# Patient Record
Sex: FEMALE | Race: WHITE
[De-identification: names, ages, dates, MRNs, and addresses within clinical notes are randomized per-mention and may not be internally consistent; named-entity substitution may affect disease eponyms.]

---

## 2021-02-05 LAB
AVERAGE GLUCOSE: NORMAL
HBA1C MFR BLD: 11.5 %

## 2022-08-01 ASSESSMENT — ENCOUNTER SYMPTOMS
DIARRHEA: 0
NAUSEA: 0
SHORTNESS OF BREATH: 0
VOMITING: 0
ABDOMINAL PAIN: 0
COUGH: 0

## 2022-08-01 NOTE — PROGRESS NOTES
[address] 65, DO  [address] [facility], [address], [address]  [phone]        ASSESSMENT AND PLAN    Problem List Items Addressed This Visit          Other    Encounter to establish care with new doctor - Primary    Abnormal urine odor      Patient reports foul-smelling, foamy urine for some time. Just started her period after no period for 6 months. Denies dysuria. Believes she is menopausal.  UA today shows 1+ leukocyte Esterase and large blood (likely from. ). We will send urine culture. Relevant Orders    AMB POC URINALYSIS DIP STICK AUTO W/O MICRO (Completed)    Lipid Panel    TSH    Comprehensive Metabolic Panel    Hemoglobin A1C    CBC with Auto Differential    Family history of diabetes mellitus in mother    Relevant Orders    Hemoglobin A1C    Weight gain     Reports some weight gain over the past few months, states that she has not changed her routine but is concerned it may be due to hormones. We will check TSH and labs but likely related to menopause. Patient plans to start going to the gym and plans to start intermittent fasting. We will continue to follow. Relevant Orders    Lipid Panel    TSH    Comprehensive Metabolic Panel    Hemoglobin A1C    CBC with Auto Differential    Pyuria    Relevant Orders    Culture, Urine    Bilateral low back pain without sciatica     Patient with months of low back pain and stiffness when she wakes up, admits to using a new mattress since getting . Denies radiation into her legs or numbness or tingling. Improves with going to the chiropractor and Tylenol as needed. We will send exercises via [employer]. Patient had an x-ray done last week at urgent care and we will try to upload that report for me to [address] We will recheck in 3 to 4 months.          Relevant Orders    Lipid Panel    TSH    Comprehensive Metabolic Panel    Hemoglobin A1C    CBC with Auto Differential     Other Visit Diagnoses       Screening for thyroid disorder        Relevant Orders    TSH    Screening, anemia, deficiency, iron        Relevant Orders    CBC with Auto Differential    Screening for diabetes mellitus        Relevant Orders    Hemoglobin A1C    Screening for lipid disorders        Relevant Orders    Lipid Panel             The diagnoses and plan were discussed with the patient, who verbalizes understanding and agrees with plan. All questions answered. Chief Complaint    Chief Complaint   Patient presents with    Establish Care    Flank Pain     Pt states symptoms started about a week ago. Rose Mayr Swift has  some odor and is \" foamy \"     Menstrual Problem     Pt states she has not had a menstrual cycle in months but is currently on one and is very light. HISTORY OF PRESENT ILLNESS    45 y.o. female presents today to establish care with a new primary care provider. States that she has had low back pain for the last few months. States that she wakes up with it every morning so has to stretch. Feels very stiff. States that she went to urgent care last week and had an X-ray that showed a chip around L4. Notes that she takes Tylenol as needed for pain because she can't take NSAIDs due to itching. States that they ran a urine that was normal.  States that she has also had some smelly, foamy urine for awhile. Has had some intermittent hot flashes and believes she is going through menopause. States that she has also had issues with weight gain and just started her period two days ago after no period for six months. States that she had her IUD removed one year ago and is not currently on any contraceptives. States that she recently switched to her 's mattress which is firmer and she believes this is contributing to her back pain. Denies radiation of pain into her legs. Denies numbness or tingling.   Notes that she has gained weight in her abdomen and plans to do more abdominal exercises, as well as intermittent fasting. Has not had routine labs checked in some time. Is not fasting today. PAST MEDICAL HISTORY    Past Medical History:   Diagnosis Date    Acute anemia        PAST SURGICAL HISTORY    Past Surgical History:   Procedure Laterality Date     SECTION         FAMILY HISTORY    Family History   Problem Relation Age of Onset    Depression Mother     High Cholesterol Mother     Diabetes Mother     No Known Problems Father        SOCIAL HISTORY    Social History     Socioeconomic History    Marital status:      Spouse name: None    Number of children: None    Years of education: None    Highest education level: None   Tobacco Use    Smoking status: Never     Passive exposure: Never    Smokeless tobacco: Never   Vaping Use    Vaping Use: Never used   Substance and Sexual Activity    Alcohol use: Yes     Alcohol/week: 2.0 standard drinks     Types: 2 Shots of liquor per week    Drug use: Never    Sexual activity: Yes     Partners: Male     Social Determinants of Health     Financial Resource Strain: Low Risk     Difficulty of Paying Living Expenses: Not hard at all   Food Insecurity: No Food Insecurity    Worried About Running Out of Food in the Last Year: Never true    Ran Out of Food in the Last Year: Never true       MEDICATIONS    No current outpatient medications on file. ALLERGIES / INTOLERANCES    Allergies   Allergen Reactions    Ibuprofen Hives    Naproxen Itching    Codeine Dizziness or Vertigo, Hallucinations, Hives, Itching, Nausea And Vomiting and Palpitations     narcotics         REVIEW OF SYSTEMS    Review of Systems   Constitutional:  Negative for fever. HENT:  Negative for congestion. Respiratory:  Negative for cough and shortness of breath. Cardiovascular:  Negative for chest pain. Gastrointestinal:  Negative for abdominal pain, diarrhea, nausea and vomiting. Genitourinary:  Positive for flank pain and vaginal bleeding.    Psychiatric/Behavioral:

## 2022-08-03 ENCOUNTER — OFFICE VISIT (OUTPATIENT)
Dept: PRIMARY CARE CLINIC | Facility: CLINIC | Age: 52
End: 2022-08-03
Payer: COMMERCIAL

## 2022-08-03 VITALS
BODY MASS INDEX: 24.14 KG/M2 | OXYGEN SATURATION: 99 % | DIASTOLIC BLOOD PRESSURE: 64 MMHG | HEIGHT: 64 IN | RESPIRATION RATE: 16 BRPM | TEMPERATURE: 98 F | HEART RATE: 75 BPM | SYSTOLIC BLOOD PRESSURE: 118 MMHG | WEIGHT: 141.4 LBS

## 2022-08-03 DIAGNOSIS — R82.81 PYURIA: ICD-10-CM

## 2022-08-03 DIAGNOSIS — Z13.29 SCREENING FOR THYROID DISORDER: ICD-10-CM

## 2022-08-03 DIAGNOSIS — Z76.89 ENCOUNTER TO ESTABLISH CARE WITH NEW DOCTOR: Primary | ICD-10-CM

## 2022-08-03 DIAGNOSIS — Z13.1 SCREENING FOR DIABETES MELLITUS: ICD-10-CM

## 2022-08-03 DIAGNOSIS — Z83.3 FAMILY HISTORY OF DIABETES MELLITUS IN MOTHER: ICD-10-CM

## 2022-08-03 DIAGNOSIS — R82.90 ABNORMAL URINE ODOR: ICD-10-CM

## 2022-08-03 DIAGNOSIS — M54.50 BILATERAL LOW BACK PAIN WITHOUT SCIATICA, UNSPECIFIED CHRONICITY: ICD-10-CM

## 2022-08-03 DIAGNOSIS — R63.5 WEIGHT GAIN: ICD-10-CM

## 2022-08-03 DIAGNOSIS — Z13.0 SCREENING, ANEMIA, DEFICIENCY, IRON: ICD-10-CM

## 2022-08-03 DIAGNOSIS — Z13.220 SCREENING FOR LIPID DISORDERS: ICD-10-CM

## 2022-08-03 PROBLEM — R87.810 ASCUS WITH POSITIVE HIGH RISK HPV CERVICAL: Status: ACTIVE | Noted: 2021-02-16

## 2022-08-03 PROBLEM — R87.610 ASCUS WITH POSITIVE HIGH RISK HPV CERVICAL: Status: ACTIVE | Noted: 2021-02-16

## 2022-08-03 LAB
BILIRUBIN, URINE, POC: NEGATIVE
BLOOD URINE, POC: ABNORMAL
GLUCOSE URINE, POC: NEGATIVE
KETONES, URINE, POC: NEGATIVE
LEUKOCYTE ESTERASE, URINE, POC: ABNORMAL
NITRITE, URINE, POC: NEGATIVE
PH, URINE, POC: 6 (ref 4.6–8)
PROTEIN,URINE, POC: NEGATIVE
SPECIFIC GRAVITY, URINE, POC: 1.03 (ref 1–1.03)
URINALYSIS CLARITY, POC: CLEAR
URINALYSIS COLOR, POC: YELLOW
UROBILINOGEN, POC: 0.2

## 2022-08-03 PROCEDURE — 81003 URINALYSIS AUTO W/O SCOPE: CPT | Performed by: FAMILY MEDICINE

## 2022-08-03 PROCEDURE — 99204 OFFICE O/P NEW MOD 45 MIN: CPT | Performed by: FAMILY MEDICINE

## 2022-08-03 SDOH — ECONOMIC STABILITY: FOOD INSECURITY: WITHIN THE PAST 12 MONTHS, YOU WORRIED THAT YOUR FOOD WOULD RUN OUT BEFORE YOU GOT MONEY TO BUY MORE.: NEVER TRUE

## 2022-08-03 SDOH — ECONOMIC STABILITY: FOOD INSECURITY: WITHIN THE PAST 12 MONTHS, THE FOOD YOU BOUGHT JUST DIDN'T LAST AND YOU DIDN'T HAVE MONEY TO GET MORE.: NEVER TRUE

## 2022-08-03 ASSESSMENT — ANXIETY QUESTIONNAIRES
4. TROUBLE RELAXING: 0
GAD7 TOTAL SCORE: 0
2. NOT BEING ABLE TO STOP OR CONTROL WORRYING: 0
1. FEELING NERVOUS, ANXIOUS, OR ON EDGE: 0
7. FEELING AFRAID AS IF SOMETHING AWFUL MIGHT HAPPEN: 0
IF YOU CHECKED OFF ANY PROBLEMS ON THIS QUESTIONNAIRE, HOW DIFFICULT HAVE THESE PROBLEMS MADE IT FOR YOU TO DO YOUR WORK, TAKE CARE OF THINGS AT HOME, OR GET ALONG WITH OTHER PEOPLE: NOT DIFFICULT AT ALL
3. WORRYING TOO MUCH ABOUT DIFFERENT THINGS: 0
6. BECOMING EASILY ANNOYED OR IRRITABLE: 0
5. BEING SO RESTLESS THAT IT IS HARD TO SIT STILL: 0

## 2022-08-03 ASSESSMENT — PATIENT HEALTH QUESTIONNAIRE - PHQ9
SUM OF ALL RESPONSES TO PHQ QUESTIONS 1-9: 0
SUM OF ALL RESPONSES TO PHQ QUESTIONS 1-9: 0
2. FEELING DOWN, DEPRESSED OR HOPELESS: 0
1. LITTLE INTEREST OR PLEASURE IN DOING THINGS: 0
SUM OF ALL RESPONSES TO PHQ QUESTIONS 1-9: 0
SUM OF ALL RESPONSES TO PHQ9 QUESTIONS 1 & 2: 0
SUM OF ALL RESPONSES TO PHQ QUESTIONS 1-9: 0

## 2022-08-03 ASSESSMENT — SOCIAL DETERMINANTS OF HEALTH (SDOH): HOW HARD IS IT FOR YOU TO PAY FOR THE VERY BASICS LIKE FOOD, HOUSING, MEDICAL CARE, AND HEATING?: NOT HARD AT ALL

## 2022-08-03 NOTE — ASSESSMENT & PLAN NOTE
Patient reports foul-smelling, foamy urine for some time. Just started her period after no period for 6 months. Denies dysuria. Believes she is menopausal.  UA today shows 1+ leukocyte Esterase and large blood (likely from. ). We will send urine culture.

## 2022-08-03 NOTE — PATIENT INSTRUCTIONS
It was great to meet you today! I will contact you about your lab results via [address] Please make sure to drink plenty of water while you are working on your diet. Eat less red meat, less starchy foods and less sweets. I will send you some more stretches to help with your back. Continue using Tylenol if needed for pain. Consider alternating ice and heat, and go to the chiropractor as long as it is helping. If your symptoms get worse please let me know.     I will see you in a few months but please all with concerns - [phone]

## 2022-08-03 NOTE — ASSESSMENT & PLAN NOTE
Patient with months of low back pain and stiffness when she wakes up, admits to using a new mattress since getting . Denies radiation into her legs or numbness or tingling. Improves with going to the chiropractor and Tylenol as needed. Discussed alternating ice and heat. We will send exercises via [employer]. Patient had an x-ray done last week at urgent care and we will try to upload that report for me to [address] We will recheck in 3 to 4 months. Please triage, check with Joselyn Evangeline, and direct patient. Thanks         From: Shantel Manual  To: Ivette Danielamileylois  Sent: 12/7/2020  5:22 AM CST  Subject: Non-Urgent Medical Question    Good morning Ivette,  So my joint pain is back. Left hip very painful, right thumb also. I am trying to manage with naproxen, but wondering if a small additional dose of methotrexate would be helpful? Or something else? Please let me know your thoughts. I'm using [facility] [address] in [facility] now.   Thanks, Jannie Marks

## 2022-08-03 NOTE — ASSESSMENT & PLAN NOTE
Left message to call for results. Reports some weight gain over the past few months, states that she has not changed her routine but is concerned it may be due to hormones. We will check TSH and labs but likely related to menopause. Patient plans to start going to the gym and plans to start intermittent fasting. We will continue to follow.

## 2022-08-06 LAB
BACTERIA SPEC CULT: NORMAL
SERVICE CMNT-IMP: NORMAL

## 2022-09-02 ENCOUNTER — OFFICE VISIT (OUTPATIENT)
Dept: ORTHOPEDIC SURGERY | Age: 52
End: 2022-09-02
Payer: COMMERCIAL

## 2022-09-02 DIAGNOSIS — M54.50 CHRONIC BILATERAL LOW BACK PAIN WITHOUT SCIATICA: ICD-10-CM

## 2022-09-02 DIAGNOSIS — M54.50 ACUTE BILATERAL LOW BACK PAIN, UNSPECIFIED WHETHER SCIATICA PRESENT: Primary | ICD-10-CM

## 2022-09-02 DIAGNOSIS — G89.29 CHRONIC BILATERAL LOW BACK PAIN WITHOUT SCIATICA: ICD-10-CM

## 2022-09-02 DIAGNOSIS — M47.816 FACET ARTHROPATHY, LUMBAR: ICD-10-CM

## 2022-09-02 PROCEDURE — 99203 OFFICE O/P NEW LOW 30 MIN: CPT | Performed by: PHYSICIAN ASSISTANT

## 2022-09-02 NOTE — PROGRESS NOTES
Name: Khalida King  YOB: 1970  Gender: female  MRN: [id number]    CC: Back Pain (Low back pain into bilateral hips and feet)       HPI: This is a 47y.o. year old female who presents with several months history of pain in the lower back. Pain can radiate across her back into the buttocks. It does not radiate into the legs. There is no numbness tingling or weakness. Pain is usually worse after standing and walking. 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 She has seen chiropractor for 2 months and initially she will get some short-term relief with some adjustments but then it seems like she has limited relief. She is also tried massage therapy. She cannot take NSAIDs. She is tried ibuprofen and Aleve however she gets symptoms that seem like allergic reaction. This patient  has not had lumbar surgery in the past.      AMB PAIN ASSESSMENT 2022   Location of Pain Back   Location Modifiers Left;Right   Severity of Pain 9   Quality of Pain Aching;Dull   Duration of Pain Persistent   Frequency of Pain Constant   Date Pain First Started 2022   Aggravating Factors Walking;Standing; Other (Comment)   Limiting Behavior Some   Relieving Factors Rest   Result of Injury No   Work-Related Injury No   Are there other pain locations you wish to document? No              ROS/Meds/PSH/PMH/FH/SH: I personally reviewed the patient's collected intake data. Below are the pertinents:    Allergies   Allergen Reactions    Ibuprofen Hives    Naproxen Itching    Codeine Dizziness or Vertigo, Hallucinations, Hives, Itching, Nausea And Vomiting and Palpitations     narcotics         No current outpatient medications on file.     Past Surgical History:   Procedure Laterality Date     SECTION         Patient Active Problem List   Diagnosis    ASCUS with positive high risk HPV cervical    Encounter to establish care with new doctor    Abnormal urine odor    Family history of diabetes mellitus in mother    Weight gain    Pyuria    Bilateral low back pain without sciatica         Tobacco:  reports that she has never smoked. She has never been exposed to tobacco smoke. She has never used smokeless tobacco.  Alcohol:   Social History     Substance and Sexual Activity   Alcohol Use Yes    Alcohol/week: 2.0 standard drinks    Types: 2 Shots of liquor per week        Physical Exam:   BMI: There is no height or weight on file to calculate BMI. GENERAL:  Adult in no acute distress, well developed, well nourished Patient is appropriately conversant  MSK:  Examination of the lumbar spine reveals no sagittal or coronal imbalance   There is mild tenderness to palpation along the spinous processes and paraspinal musculature. The patient ambulates with a normal gait. ROM of bilateral hip(s) reveals no irritability. NEURO:  Cranial nerves grossly intact. No motor deficits. Straight leg testing is negative bilateral  Sensory testing reveals intact sensation to light touch and in the distribution of the L3-S1 dermatomes bilaterally  Ankle jerk is negative for clonus    Reflexes   Right Left   Quadriceps (L4) 2 2   Achilles (S1) 2 2     Strength testing in the lower extremity reveals the following based on the 5 point grading scale:     HF (L2) H Ab (L5) KE (L3/4) ADF (L4) EHL (L5) A Ev (S1) APF (S1)   Right 5 5 5 5 5 5 5   Left 5 5 5 5 5 5 5     PSYCH:  Alert and oriented X 3. Appropriate affect. Intact judgment and insight. Radiographic Studies:     AP, lateral and spot views of the lumbar spine:  9/2/22    P of the lumbar spine shows normal alignment. Sagittal view of the lumbar spine show some mild straightening of the normal lordotic curve. No significant loss of disc space height. Mild facet arthropathy. No acute spinous abnormality. X-ray impression: Straightening normal lordotic curve      Assessment/Plan:       Diagnosis Orders   1.  Acute bilateral low back pain, unspecified whether sciatica present  XR LUMBAR SPINE (2-3 VIEWS)      2. Facet arthropathy, lumbar        3. Chronic bilateral low back pain without sciatica              This patient's clinical history and physical exam is consistent with myofascial and facetogenic back pain. I discussed with her the natural history of this condition in that most episodes are typically self limited. However, the symptoms can last for several months if not even longer. What I currently recommend is that she continues with conservative treatments to help cope with the symptoms and avoid having back surgery at this time. She understands that conservative treatments typically include activity modification, NSAIDs and physical therapy. Oral and/or epidural steroids could be considered in resistant scenarios. Also, she  may want to explore chiropractic care or acupuncture. I advised to avoid any prolonged bedrest and to try to maintain ADLs as much as possible. The patient was counseled to follow up me should she  develop any neurologic symptoms such as leg pain. - Physical Therapy was prescribed and will include stretching, strengthening and modalities to promote blood flow, flexibility and core strengthening.  - If the patient fails to respond to these efforts, I would recommend MRI of the lumbar spine for further evaluation. 4 This is a undiagnosed new problem with uncertain prognosis    No orders of the defined types were placed in this encounter. Orders Placed This Encounter   Procedures    XR LUMBAR SPINE (2-3 VIEWS)              Return in about 6 weeks (around 10/14/2022) for after PT with JAMARI. Ivonne Castro PA-C  09/02/22      Elements of this note were created using speech recognition software. As such, errors of speech recognition may be present.

## 2022-09-02 NOTE — LETTER
Rangel DOSHI  1970  MRN [id number]                                              ROOM NUMBER______      Radiographic Studies:    Cervical MRI      Thoracic MRI         Lumbar MRI          Pelvis MRI        CONTRAST    CT Myelogram: _______________   NCS/EMG ________________ ( UE  /  LE )     MRI of ___________________          Other: ____________________      Injections:    KNEE    HIP  Depomedrol _____ mg Euflexxa _____    _______________ TFESI/SNRB  _______________ SI Joint  _______________ BOBO    _______________ Facet  _______________Piriformis/ Sciatica      Medications:    Oral Steroids _______________  NSAIDS _______________    Muscle Relaxers _______________  Neurontin/Lyrica _______________    Pain Medicine _______________  Other _______________                       Physical Therapy:    Lumbar     Thoracic      Cervical     Hip       Knee       Shoulder               Traction          Ultrasound          Dry Needling      Referral:    Pain referral:  [facility]   [facility]   Other: ______________________________    Follow-up/ Refer__________________________________________________    Authorization to hold blood thinners:___________________________________

## 2022-09-16 ENCOUNTER — OFFICE VISIT (OUTPATIENT)
Dept: ORTHOPEDIC SURGERY | Age: 52
End: 2022-09-16
Payer: COMMERCIAL

## 2022-09-16 DIAGNOSIS — M54.50 CHRONIC BILATERAL LOW BACK PAIN WITHOUT SCIATICA: ICD-10-CM

## 2022-09-16 DIAGNOSIS — M54.50 ACUTE BILATERAL LOW BACK PAIN, UNSPECIFIED WHETHER SCIATICA PRESENT: Primary | ICD-10-CM

## 2022-09-16 DIAGNOSIS — M62.81 MUSCLE WEAKNESS: ICD-10-CM

## 2022-09-16 DIAGNOSIS — G89.29 CHRONIC BILATERAL LOW BACK PAIN WITHOUT SCIATICA: ICD-10-CM

## 2022-09-16 DIAGNOSIS — Z74.09 IMPAIRED FUNCTIONAL MOBILITY, BALANCE, GAIT, AND ENDURANCE: ICD-10-CM

## 2022-09-16 DIAGNOSIS — M47.816 FACET ARTHROPATHY, LUMBAR: ICD-10-CM

## 2022-09-16 PROCEDURE — 97140 MANUAL THERAPY 1/> REGIONS: CPT | Performed by: PHYSICAL THERAPIST

## 2022-09-16 PROCEDURE — 97110 THERAPEUTIC EXERCISES: CPT | Performed by: PHYSICAL THERAPIST

## 2022-09-16 PROCEDURE — 97162 PT EVAL MOD COMPLEX 30 MIN: CPT | Performed by: PHYSICAL THERAPIST
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
## 2022-09-19 ENCOUNTER — OFFICE VISIT (OUTPATIENT)
Dept: ORTHOPEDIC SURGERY | Age: 52
End: 2022-09-19
Payer: COMMERCIAL

## 2022-09-19 DIAGNOSIS — M54.50 ACUTE BILATERAL LOW BACK PAIN, UNSPECIFIED WHETHER SCIATICA PRESENT: Primary | ICD-10-CM

## 2022-09-19 DIAGNOSIS — Z74.09 IMPAIRED FUNCTIONAL MOBILITY, BALANCE, GAIT, AND ENDURANCE: ICD-10-CM

## 2022-09-19 DIAGNOSIS — M47.816 FACET ARTHROPATHY, LUMBAR: ICD-10-CM

## 2022-09-19 DIAGNOSIS — M62.81 MUSCLE WEAKNESS: ICD-10-CM

## 2022-09-19 PROCEDURE — 97140 MANUAL THERAPY 1/> REGIONS: CPT | Performed by: PHYSICAL THERAPIST

## 2022-09-19 PROCEDURE — 97110 THERAPEUTIC EXERCISES: CPT | Performed by: PHYSICAL THERAPIST

## 2022-09-19 NOTE — PROGRESS NOTES
[facility] [address] [address]  Dept: [phone]      Physical Therapy Daily Note     Referring MD: Kimberly Ames PA-C  Diagnosis:     ICD-10-CM    1. Acute bilateral low back pain, unspecified whether sciatica present  M54.50       2. Facet arthropathy, lumbar  M47.816       3. Impaired functional mobility, balance, gait, and endurance  Z74.09       4. Muscle weakness  M62.81          Therapy precautions:None  Co-morbidities affecting plan of care: none  Chief complaints:  Low back pain described as soreness/tightness in the central low back that radiates into hips/buttocks with forward bend and certain movements. Denies numbness/tingling/weakness. Pt reports some discomfort when trying to urinate and that hip pain impacts sexual activity. Pt reports no internal pain with intercourse, more tightness/discomfort in her hips. Total Timed Procedure Codes: 43 min, Total Time: 43 min    SUBJECTIVE     Pt reports that she is stiff and sore this morning as she just woke up. She ordered a gel mattress topper that should arrive in the next 2 weeks. OBJECTIVE     Findings: iliac crests asymmetrical in standing/sitting, supine to long sit (+) with RLE long to short    Treatment provided today:  Therapeutic exercise (10738) x 28 min to develop ROM, strength, endurance and flexibility of the trunk and LEs.   Nustep level 4 x 5 min  Double knee to chest stretch H30sec x 2  Hooklying lower trunk rotation w/ light adductor ball squeeze x 2 min  Hooklying hip adductor stretch H30sec x 2 B  Hooklying modified piriformis stretch with foot on floor H30sec x 2 B  Hooklying abdominal brace H5sec/relax for 3 min  Segmental bridge 2x10 w/ abdominal brace  Sidelying clamshell 1x10 B  Modified bhakti stretch H30sec x 2 B  Manual therapy (89464) x 15 min utilizing techniques to improve joint and/or soft tissue mobility, ROM, and function as well as helping to sets - 2 reps - 30 hold  Supine Piriformis Stretch with Foot on Ground - 1-2 x daily - 1 sets - 2 reps - 30 hold  Modified Alfonso Stretch - 1 x daily - 1 sets - 2 reps - 30 hold  Clamshell - 5 x weekly - 2 sets - 10 reps - 3 hold

## 2022-09-21 ENCOUNTER — TELEPHONE (OUTPATIENT)
Dept: ORTHOPEDIC SURGERY | Age: 52
End: 2022-09-21

## 2022-09-27 ENCOUNTER — OFFICE VISIT (OUTPATIENT)
Dept: ORTHOPEDIC SURGERY | Age: 52
End: 2022-09-27
Payer: COMMERCIAL

## 2022-09-27 DIAGNOSIS — M62.81 MUSCLE WEAKNESS: ICD-10-CM

## 2022-09-27 DIAGNOSIS — Z74.09 IMPAIRED FUNCTIONAL MOBILITY, BALANCE, GAIT, AND ENDURANCE: ICD-10-CM

## 2022-09-27 DIAGNOSIS — G89.29 CHRONIC BILATERAL LOW BACK PAIN WITHOUT SCIATICA: ICD-10-CM

## 2022-09-27 DIAGNOSIS — M54.50 CHRONIC BILATERAL LOW BACK PAIN WITHOUT SCIATICA: ICD-10-CM

## 2022-09-27 DIAGNOSIS — M47.816 FACET ARTHROPATHY, LUMBAR: ICD-10-CM

## 2022-09-27 DIAGNOSIS — M54.50 ACUTE BILATERAL LOW BACK PAIN, UNSPECIFIED WHETHER SCIATICA PRESENT: Primary | ICD-10-CM

## 2022-09-27 PROCEDURE — 97110 THERAPEUTIC EXERCISES: CPT | Performed by: PHYSICAL THERAPIST

## 2022-09-27 PROCEDURE — 97140 MANUAL THERAPY 1/> REGIONS: CPT | Performed by: PHYSICAL THERAPIST

## 2022-09-27 NOTE — PROGRESS NOTES
[facility] [address] [address]  Dept: [phone]      Physical Therapy Daily Note     Referring MD: Papito Simmons PA-C  Diagnosis:     ICD-10-CM    1. Acute bilateral low back pain, unspecified whether sciatica present  M54.50       2. Facet arthropathy, lumbar  M47.816       3. Impaired functional mobility, balance, gait, and endurance  Z74.09       4. Muscle weakness  M62.81       5. Chronic bilateral low back pain without sciatica [M54.50, G89.29 (ICD-10-CM)]  M54.50     G89.29          Therapy precautions:None  Co-morbidities affecting plan of care: none  Chief complaints:  Low back pain described as soreness/tightness in the central low back that radiates into hips/buttocks with forward bend and certain movements. Denies numbness/tingling/weakness. Pt reports some discomfort when trying to urinate and that hip pain impacts sexual activity. Pt reports no internal pain with intercourse, more tightness/discomfort in her hips. Total Timed Procedure Codes: 40 min, Total Time: 40 min    SUBJECTIVE     Pt reports that her mattress topper arrived and she put on the bed last night. She notes less soreness after sleeping last night. She saw the chiropractor last week and notes that she was very \"off\" in the hip and there was a loud pop when she was adjusted. OBJECTIVE     Findings: iliac crests- asymmetrical in standing, supine to long sit (+) with RLE long to short    Treatment provided today:  Therapeutic exercise (34654) x 32 min to develop ROM, strength, endurance and flexibility of the trunk and LEs.   Nustep level 4 x 5 min  Double knee to chest stretch H30sec x 2  Hooklying lower trunk rotation w/ light adductor ball squeeze x 2 min  HEP - HEPHooklying abdominal brace H5sec/relax for 3 min  Segmental bridge 2x10 w/ abdominal brace and adductor squeeze  Sidelying clamshell 2x10 B  - HEP  Side step against yellow band 10 feet B  Standing Code: [email or web address]  URL: [email or web address]  Date: 09/27/2022  Prepared by: Sammy Akins    Exercises  Supine Transversus Abdominis Bracing - Hands on Stomach - 2 x daily - 1 sets - 30 reps - 5 hold  Supine Figure 4 Piriformis Stretch - 1-2 x daily - 1 sets - 2 reps - 30 hold  Supine Piriformis Stretch with Foot on Ground - 1-2 x daily - 1 sets - 2 reps - 30 hold  Modified Alfonso Stretch - 1 x daily - 1 sets - 2 reps - 30 hold  Clamshell - 5 x weekly - 2 sets - 10 reps - 3 hold  Side Stepping with Resistance at Ankles - 5 x weekly - 2 sets - 10 reps  Standing Hip Flexion with Resistance Loop - 5 x weekly - 2 sets - 10 reps  Hip Extension with Resistance Loop - 5 x weekly - 2 sets - 10 reps

## 2022-09-28 NOTE — PROGRESS NOTES
[facility] [address] [address]  Dept: [phone]      Physical Therapy Daily Note     Referring MD: Chicho Cruz PA-C  Diagnosis:     ICD-10-CM    1. Acute bilateral low back pain, unspecified whether sciatica present  M54.50       2. Facet arthropathy, lumbar  M47.816       3. Impaired functional mobility, balance, gait, and endurance  Z74.09       4. Muscle weakness  M62.81       5. Chronic bilateral low back pain without sciatica [M54.50, G89.29 (ICD-10-CM)]  M54.50     G89.29          Therapy precautions:None  Co-morbidities affecting plan of care: none  Chief complaints:  Low back pain described as soreness/tightness in the central low back that radiates into hips/buttocks with forward bend and certain movements. Denies numbness/tingling/weakness. Pt reports some discomfort when trying to urinate and that hip pain impacts sexual activity. Pt reports no internal pain with intercourse, more tightness/discomfort in her hips. Total Timed Procedure Codes: 40 min, Total Time: 40 min    SUBJECTIVE     Pt reports that she is feeling better. She went to the MD as she had a migraine for 10 days and was given a shot of prednisone. Pt also reports that she had a chiropractic adjustment on Monday after PT. PT plans to go to the gym and chiropractor today. OBJECTIVE   Supine to long sit: R short to long  Treatment provided today:  Therapeutic exercise (43548) x 32 min to develop ROM, strength, endurance and flexibility of the trunk and LEs.   Nustep level 4 x 5 min  Double knee to chest stretch H30sec x 2  Hooklying lower trunk rotation w/ light adductor ball squeeze x 1 min  HEP - HEPHooklying abdominal brace H5sec/relax for 3 min  Segmental bridge 3x10 w/ abdominal brace and adductor squeeze  Sidelying clamshell 2x12 B  - HEP  Side step against yellow band 10 feet x 2 B  Standing hip extension/flexion against yellow band 2x10 B  Wall squats w/ ball and yellow band distal thigh 2x10  Child's pose<>prone press up on hands x 10  Cat/cow x 10  Quadruped bird dog x 10 B  Manual therapy (11055) x 8 min utilizing techniques to improve joint and/or soft tissue mobility, ROM, and function as well as helping to decrease pain/spasms and swelling. Palpation and assessment of soft tissue, muscles, and landmarks   MET to correct R posterior inominate rotation followed by isometric hip abduction, hip adduction with cavitation  STM bilateral glutes with tennis ball    ASSESSMENT     Pt with greater difficulty stabilizing on left leg for R hip extension in bird dog and standing hip extension. PLAN     Progress as tolerated    GOALS     Short term goals to be met by 10/7/2022  (3 weeks):  Patient will demonstrate good recall of HEP requiring no more than minimal verbal cuing for proper form and technique. Pt will increase gross lumbar AROM by 25% to improve ability to perform bed mobility and transfers. Pt will increase lower abdominal strength to at least 4/5 for improved spinal stabilization with functional activity. Long term goals to be met by 10/28/2022  (6 weeks):  Patient will be compliant and independent with a comprehensive HEP and activity progression. Pt will achieve functional lumbar AROM to perform bed mobility, transfers, and mobility related ADLs. Pt will increase lower abdominal strength to at least  4+/5 for improved spinal stabilization with functional activity. Pt will increase strength in B hips with MMT to at least 4/5 for improved trunk stability. Pelvic landmarks to be symmetrical with sacrum sitting in good alignment, with improved lumbar mobility to dec pain and improve functional activities. Pt will improve score on the Oswestry Low Back Pain Questionnaire to </= 20 % disability, demonstrating improved overall function. [employer]  Access Code: [email or web address]  URL: [email or web address]  Date: 09/27/2022  Prepared by: Rawamigh Brooke    Exercises  Supine Transversus Abdominis Bracing - Hands on Stomach - 2 x daily - 1 sets - 30 reps - 5 hold  Supine Figure 4 Piriformis Stretch - 1-2 x daily - 1 sets - 2 reps - 30 hold  Supine Piriformis Stretch with Foot on Ground - 1-2 x daily - 1 sets - 2 reps - 30 hold  Modified Alfonso Stretch - 1 x daily - 1 sets - 2 reps - 30 hold  Clamshell - 5 x weekly - 2 sets - 10 reps - 3 hold  Side Stepping with Resistance at Ankles - 5 x weekly - 2 sets - 10 reps  Standing Hip Flexion with Resistance Loop - 5 x weekly - 2 sets - 10 reps  Hip Extension with Resistance Loop - 5 x weekly - 2 sets - 10 reps

## 2022-09-29 ENCOUNTER — OFFICE VISIT (OUTPATIENT)
Dept: ORTHOPEDIC SURGERY | Age: 52
End: 2022-09-29
Payer: COMMERCIAL

## 2022-09-29 DIAGNOSIS — M47.816 FACET ARTHROPATHY, LUMBAR: ICD-10-CM

## 2022-09-29 DIAGNOSIS — Z74.09 IMPAIRED FUNCTIONAL MOBILITY, BALANCE, GAIT, AND ENDURANCE: ICD-10-CM

## 2022-09-29 DIAGNOSIS — G89.29 CHRONIC BILATERAL LOW BACK PAIN WITHOUT SCIATICA: ICD-10-CM

## 2022-09-29 DIAGNOSIS — M54.50 CHRONIC BILATERAL LOW BACK PAIN WITHOUT SCIATICA: ICD-10-CM

## 2022-09-29 DIAGNOSIS — M54.50 ACUTE BILATERAL LOW BACK PAIN, UNSPECIFIED WHETHER SCIATICA PRESENT: Primary | ICD-10-CM

## 2022-09-29 DIAGNOSIS — M62.81 MUSCLE WEAKNESS: ICD-10-CM

## 2022-09-29 PROCEDURE — 97140 MANUAL THERAPY 1/> REGIONS: CPT | Performed by: PHYSICAL THERAPIST

## 2022-09-29 PROCEDURE — 97110 THERAPEUTIC EXERCISES: CPT | Performed by: PHYSICAL THERAPIST

## 2022-09-30 NOTE — PROGRESS NOTES
[facility] [address] [address]  Dept: [phone]      Physical Therapy Daily Note     Referring MD: Em Sawyer PA-C  Diagnosis:     ICD-10-CM    1. Acute bilateral low back pain, unspecified whether sciatica present  M54.50       2. Facet arthropathy, lumbar  M47.816       3. Impaired functional mobility, balance, gait, and endurance  Z74.09       4. Muscle weakness  M62.81          Therapy precautions:None  Co-morbidities affecting plan of care: none  Chief complaints:  Low back pain described as soreness/tightness in the central low back that radiates into hips/buttocks with forward bend and certain movements. Denies numbness/tingling/weakness. Pt reports some discomfort when trying to urinate and that hip pain impacts sexual activity. Pt reports no internal pain with intercourse, more tightness/discomfort in her hips. Total Timed Procedure Codes: 43 min, Total Time: 43 min    SUBJECTIVE     Pt reports that she is working every day through October. She reports that her back is feeling much better. Her routine is now exercise at the gym after therapy and then get adjusted at chiropractor. She notes that adjustments have been easier. OBJECTIVE     Posture: Iliac crests/PSIS level in standing/sitting, supine to long sit negative    Treatment provided today:  Therapeutic exercise (99672) x 33 min to develop ROM, strength, endurance and flexibility of the trunk and LEs.   Nustep level 4 x 5 min  Double knee to chest stretch H30sec x 2  Hooklying abdominal brace H5sec/relax for 3 min  Segmental bridge 3x10 w/ abdominal brace and adductor squeeze  Sidelying clamshell 2x13 B  - HEP  Side step against yellow band 20 feet B  Standing hip extension/flexion against yellow band 2x10 B  Wall squats w/ ball and yellow band distal thigh 2x10  Cat/cow x 10  Quadruped bird dog x 10 B  Manual therapy (31279) x 10 min utilizing techniques to improve joint and/or soft tissue mobility, ROM, and function as well as helping to decrease pain/spasms and swelling. Palpation and assessment of soft tissue, muscles, and landmarks   STM bilateral glutes with tennis ball    ASSESSMENT     Pt with continued difficulty performing unilateral exercises in quadruped and standing. She notes muscle soreness this session with no complaint of SI/low back pain. PLAN     Progress note, update HEP    GOALS     Short term goals to be met by 10/7/2022  (3 weeks):  Patient will demonstrate good recall of HEP requiring no more than minimal verbal cuing for proper form and technique. Pt will increase gross lumbar AROM by 25% to improve ability to perform bed mobility and transfers. Pt will increase lower abdominal strength to at least 4/5 for improved spinal stabilization with functional activity. Long term goals to be met by 10/28/2022  (6 weeks):  Patient will be compliant and independent with a comprehensive HEP and activity progression. Pt will achieve functional lumbar AROM to perform bed mobility, transfers, and mobility related ADLs. Pt will increase lower abdominal strength to at least  4+/5 for improved spinal stabilization with functional activity. Pt will increase strength in B hips with MMT to at least 4/5 for improved trunk stability. Pelvic landmarks to be symmetrical with sacrum sitting in good alignment, with improved lumbar mobility to dec pain and improve functional activities. Pt will improve score on the Oswestry Low Back Pain Questionnaire to </= 20 % disability, demonstrating improved overall function. [employer]  Access Code: [email or web address]  URL: [email or web address]  Date: 09/27/2022  Prepared by: Marifer Contreras    Exercises  Supine Transversus Abdominis Bracing - Hands on Stomach - 2 x daily - 1 sets - 30 reps - 5 hold  Supine Figure 4 Piriformis Stretch - 1-2 x daily - 1 sets - 2 reps - 30 hold  Supine Piriformis Stretch with Foot on Ground - 1-2 x daily - 1 sets - 2 reps - 30 hold  Modified Alfonso Stretch - 1 x daily - 1 sets - 2 reps - 30 hold  Clamshell - 5 x weekly - 2 sets - 10 reps - 3 hold  Side Stepping with Resistance at Ankles - 5 x weekly - 2 sets - 10 reps  Standing Hip Flexion with Resistance Loop - 5 x weekly - 2 sets - 10 reps  Hip Extension with Resistance Loop - 5 x weekly - 2 sets - 10 reps

## 2022-10-03 ENCOUNTER — OFFICE VISIT (OUTPATIENT)
Dept: ORTHOPEDIC SURGERY | Age: 52
End: 2022-10-03
Payer: COMMERCIAL

## 2022-10-03 DIAGNOSIS — M62.81 MUSCLE WEAKNESS: ICD-10-CM

## 2022-10-03 DIAGNOSIS — Z74.09 IMPAIRED FUNCTIONAL MOBILITY, BALANCE, GAIT, AND ENDURANCE: ICD-10-CM

## 2022-10-03 DIAGNOSIS — M54.50 ACUTE BILATERAL LOW BACK PAIN, UNSPECIFIED WHETHER SCIATICA PRESENT: Primary | ICD-10-CM

## 2022-10-03 DIAGNOSIS — M47.816 FACET ARTHROPATHY, LUMBAR: ICD-10-CM

## 2022-10-03 PROCEDURE — 97110 THERAPEUTIC EXERCISES: CPT | Performed by: PHYSICAL THERAPIST

## 2022-10-03 PROCEDURE — 97140 MANUAL THERAPY 1/> REGIONS: CPT | Performed by: PHYSICAL THERAPIST

## 2022-10-04 NOTE — PROGRESS NOTES
[address] [facility]  [address]  [address] [place] [address] Way [address]  Dept: [phone]      Physical Therapy Progress Report     Referring MD: Soila Amato PA-C  Diagnosis:     ICD-10-CM    1. Acute bilateral low back pain, unspecified whether sciatica present  M54.50       2. Facet arthropathy, lumbar  M47.816       3. Impaired functional mobility, balance, gait, and endurance  Z74.09       4. Muscle weakness  M62.81       5. Chronic bilateral low back pain without sciatica [M54.50, G89.29 (ICD-10-CM)]  M54.50     G89.29          Therapy precautions:None  Co-morbidities affecting plan of care: none  Chief complaints:  Low back pain described as soreness/tightness in the central low back that radiates into hips/buttocks with forward bend and certain movements. Denies numbness/tingling/weakness. Pt reports some discomfort when trying to urinate and that hip pain impacts sexual activity. Pt reports no internal pain with intercourse, more tightness/discomfort in her hips. Total Timed Procedure Codes: 45 min, Total Time: 45 min    SUBJECTIVE     Nature of condition: Chronic (continuous duration > 3 months)  Describe current symptoms: Pain in the morning in the lower back with improvement in intensity from 9/10 to 4/10. Stretching improves pain. Pt also notes weakness in the hips. Pain Assessment:  Pain location: low back    Average Pain/symptom intensity (0-10 scale)  Last 24 hours: 4/10  Last week (1-7 days): 4/10  How often do you feel symptoms? Frequently (51-75%)    How much have your symptoms interfered with daily activities? Not at all  How has your condition changed since receiving care at this facility?  Much better  In general, would you say your current overall health is good     OBJECTIVE     Posture: Iliac crests/PSIS level in standing/sitting, supine to long sit negative     9/16/22 10/5/22 eval   Oswestry % deficit 38% 2%    Lumbar AROM       Flexion  ? 25%  [facility] Tightness low back eval only   Extension ? 50%  ? 25% Tightness low back eval only   R side bend [facility] WFL     L side bend [facility]     R rotation ? 50%  ? 25% Tightness eval/PN   L rotation ? 50%  ? 25% Tightness eval/PN   Lower abdominal strength  4-/5 (40 deg)  4/5 (30 deg)  Double leg lowering     Treatment provided today:  Therapeutic exercise (30468) x 40 min to develop ROM, strength, endurance and flexibility of the trunk and LEs. Nustep level 4 x 5 min  Double knee to chest stretch H30sec x 2  Lower trunk rotation with arms at side x 1 min  Sidelying open book H10sec x 10 B  Hooklying abdominal brace H5sec/relax for 3 min  Segmental bridge 3x10 w/ abdominal brace and adductor squeeze  Sidelying clamshell 2x13 B  - HEP  Side step against yellow band 20 feet B  Standing hip extension/flexion against yellow band 3x10 B  Sidelying knee flexion/extension in abduction with yellow band at distal thighs x 10 B  Wall squats w/ ball and yellow band distal thigh 2x10  Cat/cow 2x10  Quadruped bird dog x 10 B  Manual therapy (23675) x 5 min utilizing techniques to improve joint and/or soft tissue mobility, ROM, and function as well as helping to decrease pain/spasms and swelling. Palpation and assessment of soft tissue, muscles, and landmarks   STM bilateral glutes with tennis ball    ASSESSMENT     Progress Report Period: 9/16/22 to 10/5/22   As of 10/5/2022, Giovana Luz has attended 6 PT sessions. Pt's attendance has been consistent with plan of care. Pt has progressed well with treatment. She has met 3/3 short term goals, has subjective reports of decreased pain intensity and frequency, and has improved functional deficit per Oswestry from 38% to 2%. Objective findings revealed improved lumbar ROM and lower abdominal strength. Continued deficits include: decreased lower abdominal/hip strength, low back/SI pain, ROM restrictions, and motor control deficits.  Pt has not achieved max rehab potential and would benefit from continued skilled PT to address the above impairments and continue progress towards remaining therapy goals. PLAN     Continue per plan of care with increased focus on functional movement patterns and hip strength    GOALS     Short term goals to be met by 10/7/2022  (3 weeks):  Patient will demonstrate good recall of HEP requiring no more than minimal verbal cuing for proper form and technique. Goal Met 10/5/2022   Pt will increase gross lumbar AROM by 25% to improve ability to perform bed mobility and transfers. Goal Met 10/5/2022   Pt will increase lower abdominal strength to at least 4/5 for improved spinal stabilization with functional activity. Goal Met 10/5/2022     Long term goals to be met by 10/28/2022  (6 weeks):  Patient will be compliant and independent with a comprehensive HEP and activity progression. Pt will achieve functional lumbar AROM to perform bed mobility, transfers, and mobility related ADLs. Pt will increase lower abdominal strength to at least  4+/5 for improved spinal stabilization with functional activity. Pt will increase strength in B hips with MMT to at least 4/5 for improved trunk stability. Pelvic landmarks to be symmetrical with sacrum sitting in good alignment, with improved lumbar mobility to dec pain and improve functional activities. Pt will improve score on the Oswestry Low Back Pain Questionnaire to </= 20 % disability, demonstrating improved overall function. Goal Met 10/5/2022     [employer]  Access Code: [email or web address]  URL: [email or web address]  Date: 10/05/2022  Prepared by: Wilber Rviera    Exercises  Supine Transversus Abdominis Bracing - Hands on Stomach - 2 x daily - 1 sets - 30 reps - 5 hold  Supine Figure 4 Piriformis Stretch - 1-2 x daily - 1 sets - 2 reps - 30 hold  Supine Piriformis Stretch with Foot on Ground - 1-2 x daily - 1 sets - 2 reps - 30 hold  Modified Alfonso Stretch - 1 x daily - 1 sets - 2 reps - 30 hold  Sidelying Thoracic Rotation with Open Book - 1 x daily - 1 sets - 10 reps - 10 hold  Clamshell - 5 x weekly - 2 sets - 12 reps - 3 hold  Supine Bridge with Mini Swiss Ball Between Knees - 5 x weekly - 2 sets - 15 reps - 5 hold  Quadruped Pelvic Floor Contraction with Opposite Arm and Leg Lift - 5 x weekly - 1-2 sets - 10 reps - 5 hold  Sidelying Knee Flexion and Extension in Abduction - 5 x weekly - 1 sets - 10 reps  Side Stepping with Resistance at Ankles - 5 x weekly - 2 sets - 10 reps  Standing Hip Flexion with Resistance Loop - 5 x weekly - 2 sets - 10 reps  Hip Extension with Resistance Loop - 5 x weekly - 2 sets - 10 reps

## 2022-10-05 ENCOUNTER — OFFICE VISIT (OUTPATIENT)
Dept: ORTHOPEDIC SURGERY | Age: 52
End: 2022-10-05
Payer: COMMERCIAL

## 2022-10-05 DIAGNOSIS — M54.50 CHRONIC BILATERAL LOW BACK PAIN WITHOUT SCIATICA: ICD-10-CM

## 2022-10-05 DIAGNOSIS — Z74.09 IMPAIRED FUNCTIONAL MOBILITY, BALANCE, GAIT, AND ENDURANCE: ICD-10-CM

## 2022-10-05 DIAGNOSIS — M54.50 ACUTE BILATERAL LOW BACK PAIN, UNSPECIFIED WHETHER SCIATICA PRESENT: Primary | ICD-10-CM

## 2022-10-05 DIAGNOSIS — M62.81 MUSCLE WEAKNESS: ICD-10-CM

## 2022-10-05 DIAGNOSIS — M47.816 FACET ARTHROPATHY, LUMBAR: ICD-10-CM

## 2022-10-05 DIAGNOSIS — G89.29 CHRONIC BILATERAL LOW BACK PAIN WITHOUT SCIATICA: ICD-10-CM

## 2022-10-05 PROCEDURE — 97110 THERAPEUTIC EXERCISES: CPT | Performed by: PHYSICAL THERAPIST

## 2022-10-07 NOTE — PROGRESS NOTES
[facility]  [address] [place] [id number]  Dept: [phone]      Physical Therapy Daily Note     Referring MD: Yesenia Manrique PA-C  Diagnosis:     ICD-10-CM    1. Acute bilateral low back pain, unspecified whether sciatica present  M54.50       2. Facet arthropathy, lumbar  M47.816       3. Impaired functional mobility, balance, gait, and endurance  Z74.09       4. Muscle weakness  M62.81       5. Chronic bilateral low back pain without sciatica [M54.50, G89.29 (ICD-10-CM)]  M54.50     G89.29          Therapy precautions:None  Co-morbidities affecting plan of care: none  Chief complaints:  Low back pain described as soreness/tightness in the central low back that radiates into hips/buttocks with forward bend and certain movements. Denies numbness/tingling/weakness. Pt reports some discomfort when trying to urinate and that hip pain impacts sexual activity. Pt reports no internal pain with intercourse, more tightness/discomfort in her hips. Total Timed Procedure Codes: 40 min, Total Time: 45 min    SUBJECTIVE     Pt reports that her back continues to improve but she has a migraine and a lot of stress due to mom's illness. OBJECTIVE     Posture: Iliac crests/PSIS level in standing/sitting, supine to long sit negative    Treatment provided today:  Therapeutic exercise (02160) x 40 min to develop ROM, strength, endurance and flexibility of the trunk and LEs.   Nustep level 5 x 5 min  Double knee to chest stretch H30sec x 2  Sidelying open book H10sec x 10 B  Hooklying abdominal brace H5sec/relax for 3 min  Segmental bridge w/ abdominal brace, adductor squeeze, and knee extension 2x5 B  Sidelying clamshell 2x15 B  - HEP  Cat/cow x10  Quadruped bird dog x 10 B  Paloff press against double red sportcord, standing on airex pad 2x10 each way  Slow march on airex pad 2x1 min    ASSESSMENT     Pt continues to progress well, demonstrating good alignment and decreased pain. She tolerated standing activity well with greater difficulty in R unilateral stance than L during marching activity. PLAN     Review pilates    GOALS     Short term goals to be met by 10/7/2022  (3 weeks):  Patient will demonstrate good recall of HEP requiring no more than minimal verbal cuing for proper form and technique. Goal Met 10/5/2022   Pt will increase gross lumbar AROM by 25% to improve ability to perform bed mobility and transfers. Goal Met 10/5/2022   Pt will increase lower abdominal strength to at least 4/5 for improved spinal stabilization with functional activity. Goal Met 10/5/2022     Long term goals to be met by 10/28/2022  (6 weeks):  Patient will be compliant and independent with a comprehensive HEP and activity progression. Pt will achieve functional lumbar AROM to perform bed mobility, transfers, and mobility related ADLs. Pt will increase lower abdominal strength to at least  4+/5 for improved spinal stabilization with functional activity. Pt will increase strength in B hips with MMT to at least 4/5 for improved trunk stability. Pelvic landmarks to be symmetrical with sacrum sitting in good alignment, with improved lumbar mobility to dec pain and improve functional activities. Pt will improve score on the Oswestry Low Back Pain Questionnaire to </= 20 % disability, demonstrating improved overall function. Goal Met 10/5/2022     [employer]  Access Code: [email or web address]  URL: [email or web address]  Date: 10/05/2022  Prepared by: Anish Zavala    Exercises  Supine Transversus Abdominis Bracing - Hands on Stomach - 2 x daily - 1 sets - 30 reps - 5 hold  Supine Figure 4 Piriformis Stretch - 1-2 x daily - 1 sets - 2 reps - 30 hold  Supine Piriformis Stretch with Foot on Ground - 1-2 x daily - 1 sets - 2 reps - 30 hold  Modified Alfonso Stretch - 1 x daily - 1 sets - 2 reps - 30 hold  Sidelying Thoracic Rotation with Open Book - 1 x daily - 1 sets - 10 reps - 10 hold  Clamshell - 5 x weekly - 2 sets - 12 reps - 3 hold  Supine Bridge with Mini Swiss Ball Between Knees - 5 x weekly - 2 sets - 15 reps - 5 hold  Quadruped Pelvic Floor Contraction with Opposite Arm and Leg Lift - 5 x weekly - 1-2 sets - 10 reps - 5 hold  Sidelying Knee Flexion and Extension in Abduction - 5 x weekly - 1 sets - 10 reps  Side Stepping with Resistance at Ankles - 5 x weekly - 2 sets - 10 reps  Standing Hip Flexion with Resistance Loop - 5 x weekly - 2 sets - 10 reps  Hip Extension with Resistance Loop - 5 x weekly - 2 sets - 10 reps

## 2022-10-10 ENCOUNTER — OFFICE VISIT (OUTPATIENT)
Dept: ORTHOPEDIC SURGERY | Age: 52
End: 2022-10-10
Payer: COMMERCIAL

## 2022-10-10 DIAGNOSIS — Z74.09 IMPAIRED FUNCTIONAL MOBILITY, BALANCE, GAIT, AND ENDURANCE: ICD-10-CM

## 2022-10-10 DIAGNOSIS — M54.50 ACUTE BILATERAL LOW BACK PAIN, UNSPECIFIED WHETHER SCIATICA PRESENT: Primary | ICD-10-CM

## 2022-10-10 DIAGNOSIS — M54.50 CHRONIC BILATERAL LOW BACK PAIN WITHOUT SCIATICA: ICD-10-CM

## 2022-10-10 DIAGNOSIS — M47.816 FACET ARTHROPATHY, LUMBAR: ICD-10-CM

## 2022-10-10 DIAGNOSIS — M62.81 MUSCLE WEAKNESS: ICD-10-CM

## 2022-10-10 DIAGNOSIS — G89.29 CHRONIC BILATERAL LOW BACK PAIN WITHOUT SCIATICA: ICD-10-CM

## 2022-10-10 PROCEDURE — 97110 THERAPEUTIC EXERCISES: CPT | Performed by: PHYSICAL THERAPIST

## 2022-10-21 NOTE — PROGRESS NOTES
[facility] [address] [address]  Dept: [phone]      Physical Therapy Daily Note     Referring MD: Pa Bacon PA-C  Diagnosis:     ICD-10-CM    1. Acute bilateral low back pain, unspecified whether sciatica present  M54.50       2. Facet arthropathy, lumbar  M47.816       3. Impaired functional mobility, balance, gait, and endurance  Z74.09       4. Muscle weakness  M62.81          Therapy precautions:None  Co-morbidities affecting plan of care: none  Chief complaints:  Low back pain described as soreness/tightness in the central low back that radiates into hips/buttocks with forward bend and certain movements. Denies numbness/tingling/weakness. Pt reports some discomfort when trying to urinate and that hip pain impacts sexual activity. Pt reports no internal pain with intercourse, more tightness/discomfort in her hips. Total Timed Procedure Codes: 55 min, Total Time: 65 min    SUBJECTIVE     Pt reports that she has not had a chance to exercise for a week due to personal issues. Pt has been sleeping in various places (Mom's house- chair, bed, etc) and back is a little sore. OBJECTIVE     Posture: R PSIS elevated in standing and sitting supine to long sit (+) with R leg long to short    Treatment provided today:  Therapeutic exercise (86675) x 40 min to develop ROM, strength, endurance and flexibility of the trunk and LEs.   Nustep level 5 x 5 min  Sidelying open book H10sec x 10 B  Hooklying abdominal brace H5sec/relax for 3 min  Segmental bridge w/ abdominal brace, adductor squeeze x 8  Segmental bridge w/ abdominal brace, adductor squeeze, and knee extension x5 B  Sidelying clamshell 2x15 B  - HEP  Side step against yellow band 20 feet B  Standing hip extension against yellow band 2x10 B  Cat/cow x10  Quadruped bird dog x 10 B  Sarai pose H20sec x 3  Modified piriformis stretch H30sec x 2 B  Manual therapy (37814) x 15 min utilizing techniques to improve joint and/or soft tissue mobility, ROM, and function as well as helping to decrease pain/spasms and swelling. Palpation and assessment of soft tissue, muscles, and landmarks   MET to correct R posterior inominate rotation followed by isometric hip abduction, hip adduction with cavitation  R SI/lumbar manipulation in supine  STM bilateral glutes with tennis ball and manual  Sacral correction in prone  Dry Needling (un-timed code) 66282: needle insertion(s) without injection(s); 3 or more muscles: Stimulating underlying myofascial trigger points, muscular and connective tissues for the management of neuromusculoskeletal pain and movement impairment   Patient was educated on dry needling treatment, expectations, and post-treatment instructions. Dry needling precautions/contraindications: Reviewed- none noted    Needles size and location: B piriformis and gluteus medius (75 mm needles x 4, 40 mm x 2)                                             Needle count: 6 needles inserted/ 6 needles removed   Position: prone   Needle  technique left in situ x 5 minutes    Electrical Stimulation Not performed   Patient Response: Patient experienced no adverse response to treatment. Pre-Test: Piriformis stretch limited   Post-Test: Increased mobility with piriformis stretch       ASSESSMENT     Pt arrived with increased R SI pain and decreased mobility R piriformis after a stressful week with little exercise. Pt noted \"relaxing warmth\" RLE with needling at gluteals. Symptoms improved post session and pt plans to see chiropractor later today. PLAN     Review pilates and anticipate discharge to home program    GOALS     Short term goals to be met by 10/7/2022  (3 weeks):  Patient will demonstrate good recall of HEP requiring no more than minimal verbal cuing for proper form and technique.  Goal Met 10/5/2022   Pt will increase gross lumbar AROM by 25% to improve ability to perform bed mobility and transfers. Goal Met 10/5/2022   Pt will increase lower abdominal strength to at least 4/5 for improved spinal stabilization with functional activity. Goal Met 10/5/2022     Long term goals to be met by 10/28/2022  (6 weeks):  Patient will be compliant and independent with a comprehensive HEP and activity progression. Pt will achieve functional lumbar AROM to perform bed mobility, transfers, and mobility related ADLs. Pt will increase lower abdominal strength to at least  4+/5 for improved spinal stabilization with functional activity. Pt will increase strength in B hips with MMT to at least 4/5 for improved trunk stability. Pelvic landmarks to be symmetrical with sacrum sitting in good alignment, with improved lumbar mobility to dec pain and improve functional activities. Pt will improve score on the Oswestry Low Back Pain Questionnaire to </= 20 % disability, demonstrating improved overall function. Goal Met 10/5/2022     [employer]  Access Code: [email or web address]  URL: [email or web address]  Date: 10/05/2022  Prepared by: Nathan Lau    Exercises  Supine Transversus Abdominis Bracing - Hands on Stomach - 2 x daily - 1 sets - 30 reps - 5 hold  Supine Figure 4 Piriformis Stretch - 1-2 x daily - 1 sets - 2 reps - 30 hold  Supine Piriformis Stretch with Foot on Ground - 1-2 x daily - 1 sets - 2 reps - 30 hold  Modified Alfonso Stretch - 1 x daily - 1 sets - 2 reps - 30 hold  Sidelying Thoracic Rotation with Open Book - 1 x daily - 1 sets - 10 reps - 10 hold  Clamshell - 5 x weekly - 2 sets - 12 reps - 3 hold  Supine Bridge with Mini Swiss Ball Between Knees - 5 x weekly - 2 sets - 15 reps - 5 hold  Quadruped Pelvic Floor Contraction with Opposite Arm and Leg Lift - 5 x weekly - 1-2 sets - 10 reps - 5 hold  Sidelying Knee Flexion and Extension in Abduction - 5 x weekly - 1 sets - 10 reps  Side Stepping with Resistance at Ankles - 5 x weekly - 2 sets - 10 reps  Standing Hip Flexion with Resistance Loop - 5 x weekly - 2 sets - 10 reps  Hip Extension with Resistance Loop - 5 x weekly - 2 sets - 10 reps

## 2022-10-24 ENCOUNTER — OFFICE VISIT (OUTPATIENT)
Dept: ORTHOPEDIC SURGERY | Age: 52
End: 2022-10-24
Payer: COMMERCIAL

## 2022-10-24 DIAGNOSIS — M62.81 MUSCLE WEAKNESS: ICD-10-CM

## 2022-10-24 DIAGNOSIS — M47.816 FACET ARTHROPATHY, LUMBAR: ICD-10-CM

## 2022-10-24 DIAGNOSIS — M54.50 ACUTE BILATERAL LOW BACK PAIN, UNSPECIFIED WHETHER SCIATICA PRESENT: Primary | ICD-10-CM

## 2022-10-24 DIAGNOSIS — Z74.09 IMPAIRED FUNCTIONAL MOBILITY, BALANCE, GAIT, AND ENDURANCE: ICD-10-CM

## 2022-10-24 PROCEDURE — 20561 NDL INSJ W/O NJX 3+ MUSC: CPT | Performed by: PHYSICAL THERAPIST

## 2022-10-24 PROCEDURE — 97140 MANUAL THERAPY 1/> REGIONS: CPT | Performed by: PHYSICAL THERAPIST

## 2022-10-24 PROCEDURE — 97110 THERAPEUTIC EXERCISES: CPT | Performed by: PHYSICAL THERAPIST

## 2022-10-25 ENCOUNTER — OFFICE VISIT (OUTPATIENT)
Dept: ORTHOPEDIC SURGERY | Age: 52
End: 2022-10-25
Payer: COMMERCIAL

## 2022-10-25 DIAGNOSIS — M47.816 FACET ARTHROPATHY, LUMBAR: Primary | ICD-10-CM

## 2022-10-25 DIAGNOSIS — M54.50 CHRONIC BILATERAL LOW BACK PAIN WITHOUT SCIATICA: ICD-10-CM

## 2022-10-25 DIAGNOSIS — G89.29 CHRONIC BILATERAL LOW BACK PAIN WITHOUT SCIATICA: ICD-10-CM

## 2022-10-25 PROCEDURE — 99213 OFFICE O/P EST LOW 20 MIN: CPT | Performed by: PHYSICIAN ASSISTANT

## 2022-10-25 NOTE — LETTER
Americo DOSHI  1970  MRN [id number]                                              ROOM NUMBER______      Radiographic Studies:    Cervical MRI      Thoracic MRI         Lumbar MRI          Pelvis MRI        CONTRAST    CT Myelogram: _______________   NCS/EMG ________________ ( UE  /  LE )     MRI of ___________________          Other: ____________________      Injections:    KNEE    HIP  Depomedrol _____ mg Euflexxa _____    _______________ TFESI/SNRB  _______________ SI Joint  _______________ BOBO    _______________ Facet  _______________Piriformis/ Sciatica      Medications:    Oral Steroids _______________  NSAIDS _______________    Muscle Relaxers _______________  Neurontin/Lyrica _______________    Pain Medicine _______________  Other _______________                       Physical Therapy:    Lumbar     Thoracic      Cervical     Hip       Knee       Shoulder               Traction          Ultrasound          Dry Needling      Referral:    Pain referral:  [facility]   [facility]   Other: ______________________________    Follow-up/ Refer__________________________________________________    Authorization to hold blood thinners:___________________________________

## 2022-10-25 NOTE — PROGRESS NOTES
Name: Adina Gale  YOB: 1970  Gender: female  MRN: [id number]    CC: Back Pain (Follow up after Pt)       HPI: This is a 47y.o. year old female who presented with several months history of pain in the lower back. Pain can radiate across her back into the buttocks. It does not radiate into the legs. There is no numbness tingling or weakness. Pain is usually worse after standing and walking. 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 She has seen chiropractor for 2 months and initially she will get some short-term relief with some adjustments but then it seems like she has limited relief. She is also tried massage therapy. She cannot take NSAIDs. She is tried ibuprofen and Aleve however she gets symptoms that seem like allergic reaction. She is in PT with Roni Kirant has had significant relief of her lower back pain. She immediately felt a difference with physical therapy and she is going to be discharged from therapy this week. She will continue with her home exercises. AMB PAIN ASSESSMENT 9/2/2022   Location of Pain Back   Location Modifiers Left;Right   Severity of Pain 9   Quality of Pain Aching;Dull   Duration of Pain Persistent   Frequency of Pain Constant   Date Pain First Started 4/11/2022   Aggravating Factors Walking;Standing; Other (Comment)   Limiting Behavior Some   Relieving Factors Rest   Result of Injury No   Work-Related Injury No   Are there other pain locations you wish to document? No              ROS/Meds/PSH/PMH/FH/SH: I personally reviewed the patient's collected intake data. Below are the pertinents:    Allergies   Allergen Reactions    Ibuprofen Hives    Naproxen Itching    Codeine Dizziness or Vertigo, Hallucinations, Hives, Itching, Nausea And Vomiting and Palpitations     narcotics         No current outpatient medications on file.     Past Surgical History:   Procedure Laterality Date    CERVICAL POLYP REMOVAL N/A 2015    unknown side  SECTION         Patient Active Problem List   Diagnosis    ASCUS with positive high risk HPV cervical    Encounter to establish care with new doctor    Abnormal urine odor    Family history of diabetes mellitus in mother    Weight gain    Pyuria    Bilateral low back pain without sciatica         Tobacco:  reports that she has never smoked. She has never been exposed to tobacco smoke. She has never used smokeless tobacco.  Alcohol:   Social History     Substance and Sexual Activity   Alcohol Use Yes    Alcohol/week: 2.0 standard drinks    Types: 2 Shots of liquor per week        Radiographic Studies:     AP, lateral and spot views of the lumbar spine:  22    P of the lumbar spine shows normal alignment. Sagittal view of the lumbar spine show some mild straightening of the normal lordotic curve. No significant loss of disc space height. Mild facet arthropathy. No acute spinous abnormality. X-ray impression: Straightening normal lordotic curve      Assessment/Plan:       Diagnosis Orders   1. Facet arthropathy, lumbar        2. Chronic bilateral low back pain without sciatica [M54.50, G89.29 (ICD-10-CM)]                This patient's clinical history and physical exam is consistent with myofascial and facetogenic back pain. I discussed with her the natural history of this condition in that most episodes are typically self limited. However, the symptoms can last for several months if not even longer. What I currently recommend is that she continues with conservative treatments to help cope with the symptoms and avoid having back surgery at this time. She understands that conservative treatments typically include activity modification, NSAIDs and physical therapy. Oral and/or epidural steroids could be considered in resistant scenarios. Also, she  may want to explore chiropractic care or acupuncture. I advised to avoid any prolonged bedrest and to try to maintain ADLs as much as possible.  The patient was counseled to follow up me should she  develop any neurologic symptoms such as leg pain.      -The patient will be treated observantly with self directed symptomatic care. Instructions were given to follow up if there is any neurologic or functional decline. - A home exercise program was prescribed for stretching and strengthening. A list of exercises was provided. Will call if symptoms return and progress. 3 This is stable chronic illness/condition    No orders of the defined types were placed in this encounter. No orders of the defined types were placed in this encounter. Return if symptoms worsen or fail to improve. Gayathri Colunga PA-C  10/25/22      Elements of this note were created using speech recognition software. As such, errors of speech recognition may be present.

## 2022-10-27 ENCOUNTER — OFFICE VISIT (OUTPATIENT)
Dept: ORTHOPEDIC SURGERY | Age: 52
End: 2022-10-27
Payer: COMMERCIAL

## 2022-10-27 DIAGNOSIS — Z74.09 IMPAIRED FUNCTIONAL MOBILITY, BALANCE, GAIT, AND ENDURANCE: ICD-10-CM

## 2022-10-27 DIAGNOSIS — G89.29 CHRONIC BILATERAL LOW BACK PAIN WITHOUT SCIATICA: ICD-10-CM

## 2022-10-27 DIAGNOSIS — M54.50 ACUTE BILATERAL LOW BACK PAIN, UNSPECIFIED WHETHER SCIATICA PRESENT: ICD-10-CM

## 2022-10-27 DIAGNOSIS — M54.50 CHRONIC BILATERAL LOW BACK PAIN WITHOUT SCIATICA: ICD-10-CM

## 2022-10-27 DIAGNOSIS — M62.81 MUSCLE WEAKNESS: ICD-10-CM

## 2022-10-27 DIAGNOSIS — M47.816 FACET ARTHROPATHY, LUMBAR: Primary | ICD-10-CM

## 2022-10-27 PROCEDURE — 97140 MANUAL THERAPY 1/> REGIONS: CPT | Performed by: PHYSICAL THERAPIST

## 2022-10-27 PROCEDURE — 97110 THERAPEUTIC EXERCISES: CPT | Performed by: PHYSICAL THERAPIST

## 2022-10-27 NOTE — PROGRESS NOTES
[address] [address]  Dept: [phone]      Physical Therapy Discharge     Referring MD: Di Patrick PAPamelaC  Diagnosis:     ICD-10-CM    1. Facet arthropathy, lumbar  M47.816       2. Chronic bilateral low back pain without sciatica [M54.50, G89.29 (ICD-10-CM)]  M54.50     G89.29       3. Acute bilateral low back pain, unspecified whether sciatica present  M54.50       4. Impaired functional mobility, balance, gait, and endurance  Z74.09       5. Muscle weakness  M62.81            Therapy precautions:None  Co-morbidities affecting plan of care: none  Chief complaints:  Low back pain described as soreness/tightness in the central low back that radiates into hips/buttocks with forward bend and certain movements. Denies numbness/tingling/weakness. Pt reports some discomfort when trying to urinate and that hip pain impacts sexual activity. Pt reports no internal pain with intercourse, more tightness/discomfort in her hips. Total Timed Procedure Codes: 40 min, Total Time: 40 min    SUBJECTIVE     Describe current symptoms: Pt reports overall improvement in symptoms with intermittent pain across low back at level of SI joints. Muscle tension noted last session resolved with dry needling. Pt states that she is ready for discharge and knows that she has to continue her exercises. Pain Assessment:  Average Pain/symptom intensity (0-10 scale)  Last 24 hours: 2/10  Last week (1-7 days): 4/10  How often do you feel symptoms? Occasionally (26-50%)    How much have your symptoms interfered with daily activities? A little bit  How has your condition changed since receiving care at this facility?  Much better  In general, would you say your current overall health is very good    OBJECTIVE     Posture: R PSIS elevated in standing and sitting supine to long sit (+) with R leg long to short    9/16/22 10/5/22 10/27/22   Oswestry % deficit 38% 2% improved lumbar AROM, and report of decreased pain. Pt has platueaued with therapy and is independent with a home program.  She will resume Pilates/Yoga with restrictions on double leg lowering. PLAN     Discharge with continued HEP. GOALS     Short term goals to be met by 10/7/2022  (3 weeks):  Patient will demonstrate good recall of HEP requiring no more than minimal verbal cuing for proper form and technique. Goal Met 10/5/2022   Pt will increase gross lumbar AROM by 25% to improve ability to perform bed mobility and transfers. Goal Met 10/5/2022   Pt will increase lower abdominal strength to at least 4/5 for improved spinal stabilization with functional activity. Goal Met 10/5/2022     Long term goals to be met by 10/28/2022  (6 weeks):  Patient will be compliant and independent with a comprehensive HEP and activity progression. Goal Met 10/27/2022   Pt will achieve functional lumbar AROM to perform bed mobility, transfers, and mobility related ADLs. Goal Met 10/27/2022   Pt will increase lower abdominal strength to at least  4+/5 for improved spinal stabilization with functional activity. GOAL DISCONTINUED 10/27/2022  Pt will increase strength in B hips with MMT to at least 4/5 for improved trunk stability. Goal Met 10/27/2022   Pelvic landmarks to be symmetrical with sacrum sitting in good alignment, with improved lumbar mobility to dec pain and improve functional activities. Goal Met 10/27/2022   Pt will improve score on the Oswestry Low Back Pain Questionnaire to </= 20 % disability, demonstrating improved overall function. Goal Met 10/5/2022     [employer]  Access Code: [email or web address]  URL: [email or web address]  Date: 10/27/2022  Prepared by: Nathan Lau    Exercises  Supine Transversus Abdominis Bracing - Hands on Stomach - 2 x daily - 1 sets - 30 reps - 5 hold  Supine Figure 4 Piriformis Stretch - 1-2 x daily - 1 sets - 2 reps - 30 hold  Supine Piriformis Stretch with Foot on Ground - 1-2 x daily - 1 sets - 2 reps - 30 hold  Modified Alfonso Stretch - 1 x daily - 1 sets - 2 reps - 30 hold  Sidelying Thoracic Rotation with Open Book - 1 x daily - 1 sets - 10 reps - 10 hold  Clamshell - 5 x weekly - 1 sets - 15 reps - 3 hold  Sidelying Knee Flexion and Extension in Abduction - 5 x weekly - 1 sets - 10 reps  Sidelying Hip Abduction wtih Flexion and Extension (BKA) - 5 x weekly - 1 sets - 10 reps  Supine Bridge with Knee Extension and Pelvic Floor Contraction - 5 x weekly - 1 sets - 5-10 reps - 5 hold  Quadruped Pelvic Floor Contraction with Opposite Arm and Leg Lift - 5 x weekly - 1-2 sets - 10 reps - 5 hold  Side Stepping with Resistance at Ankles - 5 x weekly - 2 sets - 10 reps  Standing Hip Flexion with Resistance Loop - 5 x weekly - 2 sets - 10 reps  Hip Extension with Resistance Loop - 5 x weekly - 2 sets - 10 reps

## 2023-01-28 ENCOUNTER — APPOINTMENT (OUTPATIENT)
Dept: GENERAL RADIOLOGY | Age: 53
End: 2023-01-28
Payer: COMMERCIAL

## 2023-01-28 ENCOUNTER — HOSPITAL ENCOUNTER (EMERGENCY)
Age: 53
Discharge: HOME | End: 2023-01-28
Attending: GENERAL PRACTICE
Payer: COMMERCIAL

## 2023-01-28 VITALS
RESPIRATION RATE: 16 BRPM | SYSTOLIC BLOOD PRESSURE: 116 MMHG | DIASTOLIC BLOOD PRESSURE: 79 MMHG | OXYGEN SATURATION: 97 % | TEMPERATURE: 98.6 F | BODY MASS INDEX: 22.53 KG/M2 | HEART RATE: 77 BPM | WEIGHT: 132 LBS | HEIGHT: 64 IN

## 2023-01-28 DIAGNOSIS — R19.7 DIARRHEA, UNSPECIFIED TYPE: Primary | ICD-10-CM

## 2023-01-28 LAB
ALBUMIN SERPL-MCNC: 4 G/DL (ref 3.5–5)
ALBUMIN/GLOB SERPL: 1.1 (ref 0.4–1.6)
ALP SERPL-CCNC: 90 U/L (ref 50–130)
ALT SERPL-CCNC: 37 U/L (ref 12–65)
ANION GAP SERPL CALC-SCNC: 4 MMOL/L (ref 2–11)
APPEARANCE UR: ABNORMAL
AST SERPL-CCNC: 24 U/L (ref 15–37)
BACTERIA URNS QL MICRO: ABNORMAL /HPF
BASOPHILS # BLD: 0 K/UL (ref 0–0.2)
BASOPHILS NFR BLD: 0 % (ref 0–2)
BILIRUB SERPL-MCNC: 0.4 MG/DL (ref 0.2–1.1)
BILIRUB UR QL: NEGATIVE
BUN SERPL-MCNC: 12 MG/DL (ref 6–23)
CALCIUM SERPL-MCNC: 9.8 MG/DL (ref 8.3–10.4)
CHLORIDE SERPL-SCNC: 108 MMOL/L (ref 101–110)
CO2 SERPL-SCNC: 29 MMOL/L (ref 21–32)
COLOR UR: ABNORMAL
CREAT SERPL-MCNC: 0.61 MG/DL (ref 0.6–1)
DIFFERENTIAL METHOD BLD: ABNORMAL
EOSINOPHIL # BLD: 0.1 K/UL (ref 0–0.8)
EOSINOPHIL NFR BLD: 2 % (ref 0.5–7.8)
EPI CELLS #/AREA URNS HPF: ABNORMAL /HPF
ERYTHROCYTE [DISTWIDTH] IN BLOOD BY AUTOMATED COUNT: 11.9 % (ref 11.9–14.6)
FLUAV RNA SPEC QL NAA+PROBE: NOT DETECTED
FLUBV RNA SPEC QL NAA+PROBE: NOT DETECTED
GLOBULIN SER CALC-MCNC: 3.7 G/DL (ref 2.8–4.5)
GLUCOSE SERPL-MCNC: 94 MG/DL (ref 65–100)
GLUCOSE UR STRIP.AUTO-MCNC: NEGATIVE MG/DL
HCG UR QL: NEGATIVE
HCT VFR BLD AUTO: 39.6 % (ref 35.8–46.3)
HGB BLD-MCNC: 13.1 G/DL (ref 11.7–15.4)
HGB UR QL STRIP: NEGATIVE
IMM GRANULOCYTES # BLD AUTO: 0 K/UL (ref 0–0.5)
IMM GRANULOCYTES NFR BLD AUTO: 0 % (ref 0–5)
KETONES UR QL STRIP.AUTO: NEGATIVE MG/DL
LEUKOCYTE ESTERASE UR QL STRIP.AUTO: ABNORMAL
LIPASE SERPL-CCNC: 229 U/L (ref 73–393)
LYMPHOCYTES # BLD: 2.5 K/UL (ref 0.5–4.6)
LYMPHOCYTES NFR BLD: 32 % (ref 13–44)
MCH RBC QN AUTO: 26.7 PG (ref 26.1–32.9)
MCHC RBC AUTO-ENTMCNC: 33.1 G/DL (ref 31.4–35)
MCV RBC AUTO: 80.8 FL (ref 82–102)
MONOCYTES # BLD: 0.5 K/UL (ref 0.1–1.3)
MONOCYTES NFR BLD: 7 % (ref 4–12)
NEUTS SEG # BLD: 4.6 K/UL (ref 1.7–8.2)
NEUTS SEG NFR BLD: 59 % (ref 43–78)
NITRITE UR QL STRIP.AUTO: NEGATIVE
NRBC # BLD: 0 K/UL (ref 0–0.2)
OTHER OBSERVATIONS: ABNORMAL
PH UR STRIP: 5.5 (ref 5–9)
PLATELET # BLD AUTO: 350 K/UL (ref 150–450)
PMV BLD AUTO: 9.5 FL (ref 9.4–12.3)
POTASSIUM SERPL-SCNC: 3.9 MMOL/L (ref 3.5–5.1)
PROT SERPL-MCNC: 7.7 G/DL (ref 6.3–8.2)
PROT UR STRIP-MCNC: NEGATIVE MG/DL
RBC # BLD AUTO: 4.9 M/UL (ref 4.05–5.2)
SARS-COV-2 RDRP RESP QL NAA+PROBE: NOT DETECTED
SODIUM SERPL-SCNC: 141 MMOL/L (ref 133–143)
SOURCE: NORMAL
SP GR UR REFRACTOMETRY: 1.01 (ref 1–1.02)
UROBILINOGEN UR QL STRIP.AUTO: 0.2 EU/DL (ref 0.2–1)
WBC # BLD AUTO: 7.8 K/UL (ref 4.3–11.1)
WBC URNS QL MICRO: ABNORMAL /HPF

## 2023-01-28 PROCEDURE — 6360000002 HC RX W HCPCS: Performed by: STUDENT IN AN ORGANIZED HEALTH CARE EDUCATION/TRAINING PROGRAM

## 2023-01-28 PROCEDURE — 87635 SARS-COV-2 COVID-19 AMP PRB: CPT

## 2023-01-28 PROCEDURE — 96361 HYDRATE IV INFUSION ADD-ON: CPT

## 2023-01-28 PROCEDURE — 96374 THER/PROPH/DIAG INJ IV PUSH: CPT

## 2023-01-28 PROCEDURE — 80053 COMPREHEN METABOLIC PANEL: CPT

## 2023-01-28 PROCEDURE — 83690 ASSAY OF LIPASE: CPT

## 2023-01-28 PROCEDURE — 2580000003 HC RX 258: Performed by: STUDENT IN AN ORGANIZED HEALTH CARE EDUCATION/TRAINING PROGRAM

## 2023-01-28 PROCEDURE — 99284 EMERGENCY DEPT VISIT MOD MDM: CPT

## 2023-01-28 PROCEDURE — 85025 COMPLETE CBC W/AUTO DIFF WBC: CPT

## 2023-01-28 PROCEDURE — 87502 INFLUENZA DNA AMP PROBE: CPT

## 2023-01-28 PROCEDURE — 81001 URINALYSIS AUTO W/SCOPE: CPT

## 2023-01-28 PROCEDURE — 74018 RADEX ABDOMEN 1 VIEW: CPT

## 2023-01-28 PROCEDURE — 81025 URINE PREGNANCY TEST: CPT

## 2023-01-28 RX ORDER — 0.9 % SODIUM CHLORIDE 0.9 %
500 INTRAVENOUS SOLUTION INTRAVENOUS
Status: COMPLETED | OUTPATIENT
Start: 2023-01-28 | End: 2023-01-28

## 2023-01-28 RX ORDER — ONDANSETRON 2 MG/ML
4 INJECTION INTRAMUSCULAR; INTRAVENOUS
Status: COMPLETED | OUTPATIENT
Start: 2023-01-28 | End: 2023-01-28

## 2023-01-28 RX ORDER — ONDANSETRON 4 MG/1
4 TABLET, FILM COATED ORAL 3 TIMES DAILY PRN
Qty: 15 TABLET | Refills: 0 | Status: SHIPPED | OUTPATIENT
Start: 2023-01-28

## 2023-01-28 RX ORDER — 0.9 % SODIUM CHLORIDE 0.9 %
1000 INTRAVENOUS SOLUTION INTRAVENOUS
Status: COMPLETED | OUTPATIENT
Start: 2023-01-28 | End: 2023-01-28

## 2023-01-28 RX ORDER — HYOSCYAMINE SULFATE 0.12 MG/1
1 TABLET SUBLINGUAL 3 TIMES DAILY PRN
Qty: 60 EACH | Refills: 0 | Status: SHIPPED | OUTPATIENT
Start: 2023-01-28

## 2023-01-28 RX ADMIN — SODIUM CHLORIDE 500 ML: 9 INJECTION, SOLUTION INTRAVENOUS at 16:00

## 2023-01-28 RX ADMIN — SODIUM CHLORIDE 1000 ML: 9 INJECTION, SOLUTION INTRAVENOUS at 14:40

## 2023-01-28 RX ADMIN — ONDANSETRON 4 MG: 2 INJECTION INTRAMUSCULAR; INTRAVENOUS at 15:07

## 2023-01-28 ASSESSMENT — ENCOUNTER SYMPTOMS
RHINORRHEA: 0
VOMITING: 1
EYE PAIN: 0
EYE REDNESS: 0
CHEST TIGHTNESS: 0
DIARRHEA: 1
NAUSEA: 1
EYE DISCHARGE: 0
WHEEZING: 0
PHOTOPHOBIA: 0
VOICE CHANGE: 0
FACIAL SWELLING: 0
SHORTNESS OF BREATH: 0
SORE THROAT: 0
COUGH: 0
TROUBLE SWALLOWING: 0
APNEA: 0
ABDOMINAL PAIN: 0

## 2023-01-28 ASSESSMENT — PAIN SCALES - GENERAL: PAINLEVEL_OUTOF10: 0

## 2023-01-28 ASSESSMENT — PAIN - FUNCTIONAL ASSESSMENT: PAIN_FUNCTIONAL_ASSESSMENT: NONE - DENIES PAIN

## 2023-01-28 NOTE — ED PROVIDER NOTES
Emergency Department Provider Note                   PCP:                Cary Wilcox DO               Age: 47 y.o. Sex: female       ICD-10-CM    1. Diarrhea, unspecified type  R19.7           DISPOSITION Decision To Discharge 01/28/2023 04:13:06 PM        Medical Decision Making  2:40 PM  Suspect this patient picked up a viral illness causing vomiting and diarrhea. Improving daily. She has not been using any over-the-counter medications which I feel she may start if she like. Her abdominal exam is benign today and she does not have any pain. Do not feel that CT scan imaging is indicated. We will start with blood work and reassess. Fluids to rehydrate although no serious signs of dehydration noted and stable vitals present. Less likely would be medication reaction given only 1 dose of the antibiotic and 7 days of symptoms. Low suspicion for C. difficile given improving nature, no risk factors. Patient stable at this time. 4:10 PM  Patient has still been unable to provide stool sample. As stated before, low suspicion for C. difficile. Independent interpretation of KUB unremarkable. Would expect some toxicity if there is something such as toxic megacolon at this point but patient is very stable vitals with benign exam.  Patient report significant improvement post fluid administration. Patient wants to wait a little bit more to see if she can give a sample. Plan will likely be discharged with symptomatic management and oral hydration advised. Do not feel this patient warrants CT imaging today. 5:10 PM  Patient still unable to stool. Requesting to go home. Work-up today reassuring. Plan to be outpatient management with Levsin and Zofran and advised over-the-counter antidiarrheals. Advised oral hydration. Counseled her on warning signs return immediately for including but not noted to signs of toxicity, localized abdominal pain, fevers, blood in stool.   Patient is verbalized understanding and agreed to the plan. Discharged in stable condition. Amount and/or Complexity of Data Reviewed  Labs: ordered. Radiology: ordered. Risk  Prescription drug management. Complexity of Problem: 1 acute illness with systemic symptoms. (4)  The patients assessment required an independent historian: I spoke with a family member. I have conducted an independent ordering and review of Labs. I have conducted an independent ordering and review of X-rays. Considerations: Shared decision making was utilized in the care of this patient. Considerations: The following labs and/or imaging studies were considered but not ordered: CT scan abdomen and pelvis        Patient was discharged risks and benefits of hospitalization were discussed. ED Course as of 01/28/23 1710   Sat Jan 28, 2023   1551 3:51 PM  Patient reports nausea resolved. Feels much improved after the fluids. Reports she still does not have the urge to pee [NR]   1709 5:09 PM  Moderate leuks but no nitrates. No urinary symptoms. Do not feel this is urinary tract infection.  [NR]      ED Course User Index  [NR] MANISHA Flores        Orders Placed This Encounter   Procedures    COVID-19, Rapid    Clostridium Difficile Toxin/Antigen    Influenza A/B, Molecular    XR ABDOMEN (KUB) (SINGLE AP VIEW)    CBC with Diff    CMP    Lipase    Urinalysis    Urine Preg (Lab)    Diet NPO    Saline lock IV        Medications   0.9 % sodium chloride bolus (1,000 mLs IntraVENous New Bag 1/28/23 1440)   ondansetron (ZOFRAN) injection 4 mg (4 mg IntraVENous Given 1/28/23 1507)   0.9 % sodium chloride bolus (500 mLs IntraVENous New Bag 1/28/23 1600)       New Prescriptions    HYOSCYAMINE SULFATE SL (LEVSIN/SL) 0.125 MG SUBL    Place 1 tablet under the tongue 3 times daily as needed (abdominal pain)    ONDANSETRON (ZOFRAN) 4 MG TABLET    Take 1 tablet by mouth 3 times daily as needed for Nausea or Vomiting        Renuka López is a 47 y.o. female who presents to the Emergency Department with chief complaint of    Chief Complaint   Patient presents with    Cough    Diarrhea    Emesis      71-year-old female patient presents today complaining of vomiting and diarrhea for the past 1 week. She reports her symptoms initially started with cough and congestion that have been ongoing for several weeks when she was seen at an urgent care 1 week ago. She was placed on a Z-Vahe and prednisone. She reports after taking 1 dose of the Z-Vahe, several hours later she started having diarrhea and vomiting. She reports the vomiting resolved about 2 days ago but she is still having about 4-5 episodes of loose watery stools daily. She does report the frequency of the stooling is improving. She states she has not used any over-the-counter medicines to help her as she is concerned about their side effects. She states she was evaluated at an urgent care today who advised her to come to the ER. She reports when he was palpating her right lower quadrant she had some tenderness but other than that has not had any consistent pain. She does admit to some intermittent cramping when going to the bathroom but states it resolves after bowel movement is finished. At present time, she denies any abdominal pain. She reports overall she does seem to be improving daily but wanted to be reevaluated. Denies fevers, recent travel, antibiotic use besides 1 dose of Z-Vahe as described, previous C. difficile infection. Denies abdominal pain, abdominal distention, blood in stool, melena. Denies vomiting anymore, hematemesis. Denies chest pain or shortness of breath. Patient is primary historian quality seems reliable. The history is provided by the patient. No  was used. Review of Systems   Constitutional:  Negative for fatigue and fever.    HENT:  Negative for congestion, ear pain, facial swelling, hearing loss, rhinorrhea, sore throat, trouble swallowing and voice change. Eyes:  Negative for photophobia, pain, discharge, redness and visual disturbance. Respiratory:  Negative for apnea, cough, chest tightness, shortness of breath and wheezing. Cardiovascular:  Negative for chest pain and palpitations. Gastrointestinal:  Positive for diarrhea, nausea and vomiting (Resolved). Negative for abdominal pain (Intermittent cramping. None now). Endocrine: Negative for polydipsia, polyphagia and polyuria. Genitourinary:  Negative for decreased urine volume, dysuria, flank pain, frequency and hematuria. Musculoskeletal:  Negative for arthralgias, joint swelling, myalgias and neck stiffness. Skin:  Negative for rash and wound. Neurological:  Negative for dizziness, syncope, speech difficulty, weakness, light-headedness and headaches. Hematological:  Does not bruise/bleed easily. Psychiatric/Behavioral:  Negative for self-injury and suicidal ideas. All other systems reviewed and are negative. Past Medical History:   Diagnosis Date    Acute anemia         Past Surgical History:   Procedure Laterality Date    CERVICAL POLYP REMOVAL N/A     unknown side     SECTION          Family History   Problem Relation Age of Onset    Depression Mother     High Cholesterol Mother     Diabetes Mother     No Known Problems Father         Social History     Socioeconomic History    Marital status:      Spouse name: None    Number of children: None    Years of education: None    Highest education level: None   Tobacco Use    Smoking status: Never     Passive exposure: Never    Smokeless tobacco: Never   Vaping Use    Vaping Use: Never used   Substance and Sexual Activity    Alcohol use:  Yes     Alcohol/week: 2.0 standard drinks     Types: 2 Shots of liquor per week    Drug use: Never    Sexual activity: Yes     Partners: Male     Social Determinants of Health     Financial Resource Strain: Low Risk     Difficulty of Paying Living Expenses: Not hard at all   Food Insecurity: No Food Insecurity    Worried About [address] [place] [address] [employer] in the Last Year: Never true    Ran Out of Food in the Last Year: Never true         Ibuprofen, Naproxen, and Codeine     Previous Medications    No medications on file        Vitals signs and nursing note reviewed. Patient Vitals for the past 4 hrs:   Temp Pulse Resp BP SpO2   01/28/23 1349 98.6 °F (37 °C) 77 16 116/79 97 %          Physical Exam  Vitals and nursing note reviewed. Constitutional:       General: She is not in acute distress. Appearance: Normal appearance. She is normal weight. She is not ill-appearing, toxic-appearing or diaphoretic. Comments: Overall very well-appearing and in no acute distress. Resting comfortably in stretcher. Alert and oriented x4. Normal mentation. Speaks full sentences. HENT:      Head: Normocephalic and atraumatic. Right Ear: External ear normal.      Left Ear: External ear normal.      Nose: Nose normal.      Mouth/Throat:      Mouth: Mucous membranes are moist.      Comments: Mucosa moist   Eyes:      General: No scleral icterus. Right eye: No discharge. Left eye: No discharge. Conjunctiva/sclera: Conjunctivae normal.   Cardiovascular:      Rate and Rhythm: Normal rate and regular rhythm. Pulses: Normal pulses. Heart sounds: Normal heart sounds. Pulmonary:      Effort: Pulmonary effort is normal. No respiratory distress. Breath sounds: Normal breath sounds. No stridor. No wheezing, rhonchi or rales. Abdominal:      General: Abdomen is flat. Bowel sounds are normal. There is no distension. Palpations: Abdomen is soft. There is no mass. Tenderness: There is no abdominal tenderness. There is no right CVA tenderness, left CVA tenderness, guarding or rebound. Hernia: No hernia is present. Comments: Benign abdominal exam without tenderness. No peritoneal signs.    Genitourinary:     Comments: Refused  Musculoskeletal: General: Normal range of motion. Cervical back: Normal range of motion and neck supple. No rigidity or tenderness. Lymphadenopathy:      Cervical: No cervical adenopathy. Skin:     General: Skin is warm and dry. Capillary Refill: Capillary refill takes less than 2 seconds. Coloration: Skin is not jaundiced. Comments: Good mobility and skin turgor   Neurological:      General: No focal deficit present. Mental Status: She is alert and oriented to person, place, and time. Mental status is at baseline. Procedures    Results for orders placed or performed during the hospital encounter of 01/28/23   COVID-19, Rapid    Specimen: Nasopharyngeal   Result Value Ref Range    Source Nasopharyngeal      SARS-CoV-2, Rapid Not detected NOTD     Influenza A/B, Molecular    Specimen: Not Specified   Result Value Ref Range    Influenza A, NENA Not detected NOTD      Influenza B, NENA Not detected NOTD     XR ABDOMEN (KUB) (SINGLE AP VIEW)    Narrative    KUB 1/28/2023    CLINICAL HISTORY: Diarrhea and vomiting. FINDINGS:    2 flat views are submitted for evaluation. The visualized bowel gas pattern is  nonspecific. Gas is seen into the distal colon. Assessment for free air is  limited although no obvious free air is seen. A significant portion of the chest  is included. The heart appears mildly enlarged. Impression    1. Nonspecific bowel gas pattern.    CBC with Diff   Result Value Ref Range    WBC 7.8 4.3 - 11.1 K/uL    RBC 4.90 4.05 - 5.2 M/uL    Hemoglobin 13.1 11.7 - 15.4 g/dL    Hematocrit 39.6 35.8 - 46.3 %    MCV 80.8 (L) 82.0 - 102.0 FL    MCH 26.7 26.1 - 32.9 PG    MCHC 33.1 31.4 - 35.0 g/dL    RDW 11.9 11.9 - 14.6 %    Platelets 488 179 - 546 K/uL    MPV 9.5 9.4 - 12.3 FL    nRBC 0.00 0.0 - 0.2 K/uL    Differential Type AUTOMATED      Seg Neutrophils 59 43 - 78 %    Lymphocytes 32 13 - 44 %    Monocytes 7 4.0 - 12.0 %    Eosinophils % 2 0.5 - 7.8 %    Basophils 0 0.0 - 2.0 % Immature Granulocytes 0 0.0 - 5.0 %    Segs Absolute 4.6 1.7 - 8.2 K/UL    Absolute Lymph # 2.5 0.5 - 4.6 K/UL    Absolute Mono # 0.5 0.1 - 1.3 K/UL    Absolute Eos # 0.1 0.0 - 0.8 K/UL    Basophils Absolute 0.0 0.0 - 0.2 K/UL    Absolute Immature Granulocyte 0.0 0.0 - 0.5 K/UL   CMP   Result Value Ref Range    Sodium 141 133 - 143 mmol/L    Potassium 3.9 3.5 - 5.1 mmol/L    Chloride 108 101 - 110 mmol/L    CO2 29 21 - 32 mmol/L    Anion Gap 4 2 - 11 mmol/L    Glucose 94 65 - 100 mg/dL    BUN 12 6 - 23 MG/DL    Creatinine 0.61 0.6 - 1.0 MG/DL    Est, Glom Filt Rate >60 >60 ml/min/1.73m2    Calcium 9.8 8.3 - 10.4 MG/DL    Total Bilirubin 0.4 0.2 - 1.1 MG/DL    ALT 37 12 - 65 U/L    AST 24 15 - 37 U/L    Alk Phosphatase 90 50 - 130 U/L    Total Protein 7.7 6.3 - 8.2 g/dL    Albumin 4.0 3.5 - 5.0 g/dL    Globulin 3.7 2.8 - 4.5 g/dL    Albumin/Globulin Ratio 1.1 0.4 - 1.6     Lipase   Result Value Ref Range    Lipase 229 73 - 393 U/L   Urinalysis   Result Value Ref Range    Color, UA YELLOW/STRAW      Appearance CLOUDY      Specific Friendly, UA 1.012 1.001 - 1.023      pH, Urine 5.5 5.0 - 9.0      Protein, UA Negative NEG mg/dL    Glucose, UA Negative mg/dL    Ketones, Urine Negative NEG mg/dL    Bilirubin Urine Negative NEG      Blood, Urine Negative NEG      Urobilinogen, Urine 0.2 0.2 - 1.0 EU/dL    Nitrite, Urine Negative NEG      Leukocyte Esterase, Urine MODERATE (A) NEG      WBC, UA 5-10 0 /hpf    Epithelial Cells UA 5-10 0 /hpf    BACTERIA, URINE TRACE 0 /hpf    Other observations RESULTS VERIFIED MANUALLY     Urine Preg (Lab)   Result Value Ref Range    HCG(Urine) Pregnancy Test Negative NEG          XR ABDOMEN (KUB) (SINGLE AP VIEW)   Final Result   1. Nonspecific bowel gas pattern. Voice dictation software was used during the making of this note. This software is not perfect and grammatical and other typographical errors may be present.   This note has not been completely proofread for errors.      Carley Walsh [place]  01/28/23 2598

## 2023-01-28 NOTE — DISCHARGE INSTRUCTIONS
I suspect your vomiting and diarrhea is from a viral illness. This should continue to improve daily. We have rehydrated you today with fluids. Use prescribed Zofran as needed for nausea. You may use prescribed Levsin as needed for bowel spasm and cramping. You may use over-the-counter Imodium for loose stools. Be sure to hydrate well and do not allow yourself to get dehydrated. I do recommend Pedialyte. Return here for new or worsening symptoms. As we discussed, I did not find a life threatening cause of your symptoms today. However, THAT DOES NOT MEAN IT COULD NOT DEVELOP. If you develop ANY new or worsening symptoms, it is critical that you return for re-evaluation. This includes any symptoms that are concerning to you, especially symptoms such as fevers, abdominal pain that is constant, blood in stool or vomitus. If you do not return for re-evaluation, you risk serious complications, including death.

## 2023-01-28 NOTE — ED NOTES
I have reviewed discharge instructions with the patient. The patient verbalized understanding. Patient left ED via Discharge Method: ambulatory to Home with self. Opportunity for questions and clarification provided. Patient given 2 scripts. To continue your aftercare when you leave the hospital, you may receive an automated call from our care team to check in on how you are doing. This is a free service and part of our promise to provide the best care and service to meet your aftercare needs.  If you have questions, or wish to unsubscribe from this service please call [phone]. Thank you for Choosing our [facility] Emergency Department.         Tin Draper RN  01/28/23 9894

## 2023-01-28 NOTE — ED TRIAGE NOTES
Patient advises that she was seen for cough, congestion about 4 weeks ago and was seen at urgent care on 1/22 placed on prednisone, and Zithromax took 1 dose on that day since that time patient has had severe diarrhea and vomiting. Patient advises that she has stool now only liquid and sometimes uncontrollable and \"odd garlic\" odor. Patient was sensitive to right lower quad palpation today again at urgent care. Masked.

## 2023-07-21 ENCOUNTER — COMMUNITY OUTREACH (OUTPATIENT)
Dept: PRIMARY CARE CLINIC | Facility: CLINIC | Age: 53
End: 2023-07-21

## 2025-01-21 ENCOUNTER — TRANSCRIBE ORDERS (OUTPATIENT)
Facility: HOSPITAL | Age: 55
End: 2025-01-21

## 2025-01-21 DIAGNOSIS — R10.30 LOWER ABDOMINAL PAIN: Primary | ICD-10-CM

## 2025-01-31 ENCOUNTER — HOSPITAL ENCOUNTER (OUTPATIENT)
Dept: ULTRASOUND IMAGING | Age: 55
Discharge: HOME | End: 2025-01-31
Payer: COMMERCIAL

## 2025-01-31 DIAGNOSIS — R10.30 LOWER ABDOMINAL PAIN: ICD-10-CM

## 2025-01-31 PROCEDURE — 76856 US EXAM PELVIC COMPLETE: CPT

## 2025-03-30 ENCOUNTER — HOSPITAL ENCOUNTER (EMERGENCY)
Age: 55
Discharge: HOME | End: 2025-03-30
Payer: COMMERCIAL

## 2025-03-30 VITALS
HEART RATE: 80 BPM | SYSTOLIC BLOOD PRESSURE: 110 MMHG | WEIGHT: 135 LBS | HEIGHT: 64 IN | BODY MASS INDEX: 23.05 KG/M2 | RESPIRATION RATE: 16 BRPM | OXYGEN SATURATION: 100 % | TEMPERATURE: 98.4 F | DIASTOLIC BLOOD PRESSURE: 70 MMHG

## 2025-03-30 DIAGNOSIS — R11.2 NAUSEA VOMITING AND DIARRHEA: Primary | ICD-10-CM

## 2025-03-30 DIAGNOSIS — R82.71 BACTERIURIA: ICD-10-CM

## 2025-03-30 DIAGNOSIS — R19.7 NAUSEA VOMITING AND DIARRHEA: Primary | ICD-10-CM

## 2025-03-30 DIAGNOSIS — E86.0 DEHYDRATION: ICD-10-CM

## 2025-03-30 LAB
ALBUMIN SERPL-MCNC: 3.9 G/DL (ref 3.5–5)
ALBUMIN/GLOB SERPL: 1.3 (ref 1–1.9)
ALP SERPL-CCNC: 80 U/L (ref 35–104)
ALT SERPL-CCNC: 23 U/L (ref 8–45)
ANION GAP SERPL CALC-SCNC: 13 MMOL/L (ref 7–16)
AST SERPL-CCNC: 29 U/L (ref 15–37)
BACTERIA URNS QL MICRO: ABNORMAL /HPF
BASOPHILS # BLD: 0.03 K/UL (ref 0–0.2)
BASOPHILS NFR BLD: 0.8 % (ref 0–2)
BILIRUB SERPL-MCNC: 0.2 MG/DL (ref 0–1.2)
BILIRUB UR QL: NEGATIVE
BUN SERPL-MCNC: 11 MG/DL (ref 6–23)
CALCIUM SERPL-MCNC: 9.4 MG/DL (ref 8.8–10.2)
CASTS URNS QL MICRO: 0 /LPF
CHLORIDE SERPL-SCNC: 104 MMOL/L (ref 98–107)
CO2 SERPL-SCNC: 21 MMOL/L (ref 20–29)
CREAT SERPL-MCNC: 0.59 MG/DL (ref 0.6–1.1)
CRYSTALS URNS QL MICRO: 0 /LPF
DIFFERENTIAL METHOD BLD: ABNORMAL
EOSINOPHIL # BLD: 0.04 K/UL (ref 0–0.8)
EOSINOPHIL NFR BLD: 1.1 % (ref 0.5–7.8)
EPI CELLS #/AREA URNS HPF: ABNORMAL /HPF
ERYTHROCYTE [DISTWIDTH] IN BLOOD BY AUTOMATED COUNT: 12.7 % (ref 11.9–14.6)
GLOBULIN SER CALC-MCNC: 3 G/DL (ref 2.3–3.5)
GLUCOSE SERPL-MCNC: 89 MG/DL (ref 70–99)
GLUCOSE UR QL STRIP.AUTO: NEGATIVE MG/DL
HCG, URINE, POC: NEGATIVE
HCT VFR BLD AUTO: 41.7 % (ref 35.8–46.3)
HGB BLD-MCNC: 14 G/DL (ref 11.7–15.4)
IMM GRANULOCYTES # BLD AUTO: 0 K/UL (ref 0–0.5)
IMM GRANULOCYTES NFR BLD AUTO: 0 % (ref 0–5)
KETONES UR-MCNC: 15 MG/DL
LEUKOCYTE ESTERASE UR QL STRIP: ABNORMAL
LIPASE SERPL-CCNC: 49 U/L (ref 13–60)
LYMPHOCYTES # BLD: 1.18 K/UL (ref 0.5–4.6)
LYMPHOCYTES NFR BLD: 33.4 % (ref 13–44)
Lab: NORMAL
MAGNESIUM SERPL-MCNC: 2 MG/DL (ref 1.8–2.4)
MCH RBC QN AUTO: 26.9 PG (ref 26.1–32.9)
MCHC RBC AUTO-ENTMCNC: 33.6 G/DL (ref 31.4–35)
MCV RBC AUTO: 80.2 FL (ref 82–102)
MONOCYTES # BLD: 0.47 K/UL (ref 0.1–1.3)
MONOCYTES NFR BLD: 13.3 % (ref 4–12)
MUCOUS THREADS URNS QL MICRO: ABNORMAL /LPF
NEGATIVE QC PASS/FAIL: NORMAL
NEUTS SEG # BLD: 1.81 K/UL (ref 1.7–8.2)
NEUTS SEG NFR BLD: 51.4 % (ref 43–78)
NITRITE UR QL: NEGATIVE
NRBC # BLD: 0 K/UL (ref 0–0.2)
OTHER OBSERVATIONS: ABNORMAL
PH UR: 6 (ref 5–9)
PLATELET # BLD AUTO: 271 K/UL (ref 150–450)
PMV BLD AUTO: 9.8 FL (ref 9.4–12.3)
POSITIVE QC PASS/FAIL: NORMAL
POTASSIUM SERPL-SCNC: 3.4 MMOL/L (ref 3.5–5.1)
PROT SERPL-MCNC: 6.9 G/DL (ref 6.3–8.2)
PROT UR QL: NEGATIVE MG/DL
RBC # BLD AUTO: 5.2 M/UL (ref 4.05–5.2)
RBC # UR STRIP: ABNORMAL
RBC #/AREA URNS HPF: ABNORMAL /HPF
SERVICE CMNT-IMP: ABNORMAL
SODIUM SERPL-SCNC: 138 MMOL/L (ref 136–145)
SP GR UR: 1.02 (ref 1–1.02)
UROBILINOGEN UR QL: 0.2 EU/DL (ref 0.2–1)
WBC # BLD AUTO: 3.5 K/UL (ref 4.3–11.1)
WBC URNS QL MICRO: ABNORMAL /HPF

## 2025-03-30 PROCEDURE — 96374 THER/PROPH/DIAG INJ IV PUSH: CPT

## 2025-03-30 PROCEDURE — 6360000002 HC RX W HCPCS: Performed by: STUDENT IN AN ORGANIZED HEALTH CARE EDUCATION/TRAINING PROGRAM

## 2025-03-30 PROCEDURE — 85025 COMPLETE CBC W/AUTO DIFF WBC: CPT

## 2025-03-30 PROCEDURE — 99284 EMERGENCY DEPT VISIT MOD MDM: CPT

## 2025-03-30 PROCEDURE — 83690 ASSAY OF LIPASE: CPT

## 2025-03-30 PROCEDURE — 2580000003 HC RX 258: Performed by: STUDENT IN AN ORGANIZED HEALTH CARE EDUCATION/TRAINING PROGRAM

## 2025-03-30 PROCEDURE — 83735 ASSAY OF MAGNESIUM: CPT

## 2025-03-30 PROCEDURE — 6370000000 HC RX 637 (ALT 250 FOR IP): Performed by: STUDENT IN AN ORGANIZED HEALTH CARE EDUCATION/TRAINING PROGRAM

## 2025-03-30 PROCEDURE — 96361 HYDRATE IV INFUSION ADD-ON: CPT

## 2025-03-30 PROCEDURE — 80053 COMPREHEN METABOLIC PANEL: CPT

## 2025-03-30 PROCEDURE — 81001 URINALYSIS AUTO W/SCOPE: CPT

## 2025-03-30 RX ORDER — HYOSCYAMINE SULFATE 0.12 MG/1
0.12 TABLET SUBLINGUAL 3 TIMES DAILY PRN
Qty: 42 TABLET | Refills: 0 | Status: SHIPPED | OUTPATIENT
Start: 2025-03-30

## 2025-03-30 RX ORDER — 0.9 % SODIUM CHLORIDE 0.9 %
1000 INTRAVENOUS SOLUTION INTRAVENOUS
Status: COMPLETED | OUTPATIENT
Start: 2025-03-30 | End: 2025-03-30

## 2025-03-30 RX ORDER — ESTRADIOL 0.05 MG/D
1 PATCH, EXTENDED RELEASE TRANSDERMAL
COMMUNITY

## 2025-03-30 RX ORDER — 0.9 % SODIUM CHLORIDE 0.9 %
1000 INTRAVENOUS SOLUTION INTRAVENOUS ONCE
Status: DISCONTINUED | OUTPATIENT
Start: 2025-03-30 | End: 2025-03-30

## 2025-03-30 RX ORDER — HYOSCYAMINE SULFATE 0.12 MG/1
0.12 TABLET SUBLINGUAL
Status: COMPLETED | OUTPATIENT
Start: 2025-03-30 | End: 2025-03-30

## 2025-03-30 RX ORDER — CEFDINIR 300 MG/1
300 CAPSULE ORAL 2 TIMES DAILY
Qty: 14 CAPSULE | Refills: 0 | Status: SHIPPED | OUTPATIENT
Start: 2025-03-30 | End: 2025-04-06

## 2025-03-30 RX ORDER — ONDANSETRON 2 MG/ML
4 INJECTION INTRAMUSCULAR; INTRAVENOUS ONCE
Status: COMPLETED | OUTPATIENT
Start: 2025-03-30 | End: 2025-03-30

## 2025-03-30 RX ORDER — LOPERAMIDE HYDROCHLORIDE 2 MG/1
2 CAPSULE ORAL
Status: COMPLETED | OUTPATIENT
Start: 2025-03-30 | End: 2025-03-30

## 2025-03-30 RX ORDER — ONDANSETRON 4 MG/1
4 TABLET, FILM COATED ORAL 3 TIMES DAILY PRN
Qty: 15 TABLET | Refills: 0 | Status: SHIPPED | OUTPATIENT
Start: 2025-03-30

## 2025-03-30 RX ORDER — MULTIVIT-MIN/FERROUS GLUCONATE 9 MG/15 ML
15 LIQUID (ML) ORAL DAILY
COMMUNITY

## 2025-03-30 RX ORDER — ESTRADIOL 0.1 MG/G
2 CREAM VAGINAL
COMMUNITY
Start: 2025-02-21

## 2025-03-30 RX ADMIN — HYOSCYAMINE SULFATE 0.12 MG: 0.12 TABLET ORAL; SUBLINGUAL at 11:51

## 2025-03-30 RX ADMIN — LOPERAMIDE HYDROCHLORIDE 2 MG: 2 CAPSULE ORAL at 11:51

## 2025-03-30 RX ADMIN — SODIUM CHLORIDE 1000 ML: 0.9 INJECTION, SOLUTION INTRAVENOUS at 13:39

## 2025-03-30 RX ADMIN — ONDANSETRON 4 MG: 2 INJECTION, SOLUTION INTRAMUSCULAR; INTRAVENOUS at 11:52

## 2025-03-30 RX ADMIN — SODIUM CHLORIDE 1000 ML: 0.9 INJECTION, SOLUTION INTRAVENOUS at 11:56

## 2025-03-30 ASSESSMENT — PAIN - FUNCTIONAL ASSESSMENT: PAIN_FUNCTIONAL_ASSESSMENT: 0-10

## 2025-03-30 ASSESSMENT — PAIN SCALES - GENERAL: PAINLEVEL_OUTOF10: 8

## 2025-03-30 ASSESSMENT — PAIN DESCRIPTION - LOCATION: LOCATION: ABDOMEN

## 2025-03-30 ASSESSMENT — PAIN DESCRIPTION - ORIENTATION: ORIENTATION: LOWER

## 2025-03-30 NOTE — ED TRIAGE NOTES
Pt CO abd pain since Friday, then started having n/v/d starting Saturday morning. Pt states the vomiting has stopped, but nausea and diarrhea continuing.     Pt states she had tea at a restaurant on Friday night and the glass looked dirty after drinking the whole drink. Pt also states she returned from [place] Friday.     Pt advises that her  currently has a cold. He has not been tested, but daughter believes he may have COVID. Pt's  does not have same symptoms as pt.

## 2025-03-30 NOTE — DISCHARGE INSTRUCTIONS
Your workup here was reassuring overall.  You are suspected to have a viral GI stomach bug.  Your urine did show signs of possible bacterial infection so we have started you on antibiotics.  Please take them for the full course.  We have also sent nausea medicine and abdominal pain medicine to your pharmacy.  You can use over-the-counter Imodium as needed for diarrhea.  Return here for new or worsening symptoms.    As we discussed, I did not find a life threatening cause of your symptoms today. However, THAT DOES NOT MEAN IT COULD NOT DEVELOP. If you develop ANY new or worsening symptoms, it is critical that you return for re-evaluation. This includes any symptoms that are concerning to you, especially symptoms such as abdominal pain that is new or worsening, fevers, inability to tolerate oral intake, blood in your poop.  If you do not return for re-evaluation, you risk serious complications, including death.    It was my pleasure to take care of you today in the emergency department. Thank you for coming in today. I hope that we were able to help you out and make you more comfortable. I hope you have a speedy recovery! If you do get a survey in the mail asking how your care was, it really helps me and our department out if you respond. Thank you!

## 2025-03-30 NOTE — ED NOTES
Patient mobility status  with no difficulty.     I have reviewed discharge instructions with the patient.  The patient verbalized understanding.    Patient left ED via Discharge Method: ambulatory to Home with daughter.    Opportunity for questions and clarification provided.     Patient given 3 scripts.

## 2025-03-30 NOTE — ED PROVIDER NOTES
Neutrophils Absolute 1.81 1.70 - 8.20 K/UL    Lymphocytes Absolute 1.18 0.50 - 4.60 K/UL    Monocytes Absolute 0.47 0.10 - 1.30 K/UL    Eosinophils Absolute 0.04 0.00 - 0.80 K/UL    Basophils Absolute 0.03 0.00 - 0.20 K/UL    Immature Granulocytes Absolute 0.00 0.0 - 0.5 K/UL   CMP   Result Value Ref Range    Sodium 138 136 - 145 mmol/L    Potassium 3.4 (L) 3.5 - 5.1 mmol/L    Chloride 104 98 - 107 mmol/L    CO2 21 20 - 29 mmol/L    Anion Gap 13 7 - 16 mmol/L    Glucose 89 70 - 99 mg/dL    BUN 11 6 - 23 MG/DL    Creatinine 0.59 (L) 0.60 - 1.10 MG/DL    Est, Glom Filt Rate >90 >60 ml/min/1.73m2    Calcium 9.4 8.8 - 10.2 MG/DL    Total Bilirubin 0.2 0.0 - 1.2 MG/DL    ALT 23 8 - 45 U/L    AST 29 15 - 37 U/L    Alk Phosphatase 80 35 - 104 U/L    Total Protein 6.9 6.3 - 8.2 g/dL    Albumin 3.9 3.5 - 5.0 g/dL    Globulin 3.0 2.3 - 3.5 g/dL    Albumin/Globulin Ratio 1.3 1.0 - 1.9     Lipase   Result Value Ref Range    Lipase 49 13 - 60 U/L   Magnesium   Result Value Ref Range    Magnesium 2.0 1.8 - 2.4 mg/dL   Urinalysis, Micro   Result Value Ref Range    WBC, UA 3-5 0 /hpf    RBC, UA 0-3 0 /hpf    Epithelial Cells, UA 5-10 0 /hpf    BACTERIA, URINE 1+ (H) 0 /hpf    Casts 0 0 /lpf    Crystals 0 0 /LPF    Mucus, UA 1+ (H) 0 /lpf    Other observations RESULTS VERIFIED MANUALLY     POC Pregnancy Urine Qual   Result Value Ref Range    HCG, Urine, POC Negative Negative    Lot Number [id number]     Positive QC Pass/Fail Acceptable     Negative QC Pass/Fail Acceptable    POCT Urinalysis no Micro   Result Value Ref Range    Specific Gravity, Urine, POC 1.020 1.001 - 1.023      pH, Urine, POC 6.0 5.0 - 9.0      Protein, Urine, POC Negative NEG mg/dL    Glucose, UA POC Negative NEG mg/dL    Ketones, Urine, POC 15 (A) NEG mg/dL    Bilirubin, Urine, POC Negative NEG      Blood, UA POC MODERATE (A) NEG      URINE UROBILINOGEN POC 0.2 0.2 - 1.0 EU/dL    Nitrite, Urine, POC Negative NEG      Leukocyte Est, UA POC TRACE (A) NEG